# Patient Record
Sex: FEMALE | Race: WHITE | Employment: UNEMPLOYED | URBAN - METROPOLITAN AREA
[De-identification: names, ages, dates, MRNs, and addresses within clinical notes are randomized per-mention and may not be internally consistent; named-entity substitution may affect disease eponyms.]

---

## 2017-07-12 ENCOUNTER — OFFICE VISIT (OUTPATIENT)
Dept: INTERNAL MEDICINE CLINIC | Age: 42
End: 2017-07-12

## 2017-07-12 VITALS
RESPIRATION RATE: 20 BRPM | DIASTOLIC BLOOD PRESSURE: 82 MMHG | OXYGEN SATURATION: 98 % | HEIGHT: 60 IN | SYSTOLIC BLOOD PRESSURE: 120 MMHG | HEART RATE: 102 BPM | BODY MASS INDEX: 34.75 KG/M2 | TEMPERATURE: 98 F | WEIGHT: 177 LBS

## 2017-07-12 DIAGNOSIS — S33.5XXA SPRAIN, LOW BACK, INITIAL ENCOUNTER: Primary | ICD-10-CM

## 2017-07-12 DIAGNOSIS — M62.830 BACK MUSCLE SPASM: ICD-10-CM

## 2017-07-12 DIAGNOSIS — F41.8 DEPRESSION WITH ANXIETY: ICD-10-CM

## 2017-07-12 RX ORDER — PREDNISONE 20 MG/1
TABLET ORAL
Qty: 13 TAB | Refills: 0 | Status: SHIPPED | OUTPATIENT
Start: 2017-07-12 | End: 2017-12-11 | Stop reason: ALTCHOICE

## 2017-07-12 RX ORDER — BUPROPION HYDROCHLORIDE 75 MG/1
75 TABLET ORAL 2 TIMES DAILY
Qty: 60 TAB | Refills: 2 | Status: SHIPPED | OUTPATIENT
Start: 2017-07-12 | End: 2017-12-05 | Stop reason: SDUPTHER

## 2017-07-12 RX ORDER — CYCLOBENZAPRINE HCL 10 MG
10 TABLET ORAL
Qty: 10 TAB | Refills: 0 | Status: SHIPPED | OUTPATIENT
Start: 2017-07-12 | End: 2017-07-22

## 2017-07-12 RX ORDER — OMEPRAZOLE 20 MG/1
20 TABLET, DELAYED RELEASE ORAL DAILY
Qty: 30 TAB | Refills: 0 | Status: SHIPPED | OUTPATIENT
Start: 2017-07-12 | End: 2017-09-05 | Stop reason: SDUPTHER

## 2017-07-12 NOTE — PROGRESS NOTES
Written by Bianca Quezada, as dictated by Dr. Cam Alvarez MD.    Julio Zuluaga is a 39 y.o. female. HPI  The patient comes in today c/o back pain, mainly on the R side. She pulled her back while in the pool on Friday night. She has tried aspercreme and icy hot patches, which has not helped. She also c/o chest tightness. She denies coughing or wheezing. She has to sleep on her back because sleeping on her side or stomach increases the tightness. She thought it was heartburn at first but it slowly became more intense and spread out over her whole chest. She denies any more stress than usual. The tightness makes her throat feel somewhat numb. She has not been taking Wellbutrin lately but would like to restart, as she feels a difference when she stops. Patient Active Problem List   Diagnosis Code    Depression F32.9        No current outpatient prescriptions on file prior to visit. No current facility-administered medications on file prior to visit. No Known Allergies    Past Medical History:   Diagnosis Date    Anxiety     Cancer ([facility] [address] cervical    Hypertension     with pregnancy       Past Surgical History:   Procedure Laterality Date    HX GYN      ectopic pregnancy, C section    HX HEENT      tonsilectomy       Family History   Problem Relation Age of Onset    Psychiatric Disorder Mother     Cancer Paternal Grandmother     Diabetes Paternal Grandmother        Social History     Social History    Marital status: UNKNOWN     Spouse name: N/A    Number of children: N/A    Years of education: N/A     Occupational History    Not on file.      Social History Main Topics    Smoking status: Former Smoker     Quit date: 11/25/2013    Smokeless tobacco: Never Used    Alcohol use No      Comment: occ    Drug use: No    Sexual activity: Yes     Partners: Male     Other Topics Concern    Not on file     Social History Narrative         Review of Systems   Constitutional: Negative for malaise/fatigue. HENT: Negative for congestion. Respiratory: Negative for cough and shortness of breath. Cardiovascular: Negative for chest pain and palpitations. Gastrointestinal: Positive for heartburn. Negative for abdominal pain. Musculoskeletal: Positive for back pain. Negative for joint pain and myalgias. Neurological: Negative for weakness and headaches. Visit Vitals    /82    Pulse (!) 102    Temp 98 °F (36.7 °C) (Oral)    Resp 20    Ht 5' (1.524 m)    Wt 177 lb (80.3 kg)    LMP Comment: see's specilist, unknown why no perid    SpO2 98%    BMI 34.57 kg/m2       Physical Exam   Constitutional: She is oriented to person, place, and time. She appears well-developed. No distress. Obese   HENT:   Right Ear: External ear normal.   Left Ear: External ear normal.   Eyes: Conjunctivae and EOM are normal.   Neck: Normal range of motion. Neck supple. Cardiovascular: Normal rate and regular rhythm. Pulmonary/Chest: Effort normal and breath sounds normal. She has no wheezes. Abdominal: Soft. Bowel sounds are normal. There is no tenderness. Musculoskeletal:   Low back stiffness. SLRT +ve at 45 degree on right leg. Lower spine ROM limited due to pain. Neurological: She is alert and oriented to person, place, and time. Psychiatric: She has a normal mood and affect. Her behavior is normal.   Nursing note and vitals reviewed. ASSESSMENT and PLAN    ICD-10-CM ICD-9-CM    1. Sprain, low back, initial encounter S33. 9XXA 846.9 predniSONE (DELTASONE) 20 mg tablet sent to pharmacy      omeprazole (PRILOSEC OTC) 20 mg tablet sent to pharmacy   2. Back muscle spasm M62.830 724.8 predniSONE (DELTASONE) 20 mg tablet sent to pharmacy      omeprazole (PRILOSEC OTC) 20 mg tablet sent to pharmacy      cyclobenzaprine (FLEXERIL) 10 mg tablet sent to pharmacy    I want her to take prednisone.  I want the patient to take the medication po as follows: 3 pills x 2 days, 2 pills x 2 days, 1 pill x 2 days and 1/2 pill x 1 day. The patient was advised to take this medication with food. If she does not feel better by Monday, she should call us. She should also take Flexeril once a day at night. I want her to take Prilosec in the morning 30 minutes before eating. 3. Depression with anxiety F41.8 300.4 buPROPion (WELLBUTRIN) 75 mg tablet sent to pharmacy    I refilled her Wellbutrin. This plan was reviewed with the patient and patient agrees. All questions were answered. This scribe documentation was reviewed by me and accurately reflects the examination and decisions made by me. This note will not be viewable in [address]

## 2017-07-12 NOTE — MR AVS SNAPSHOT
Visit Information Date & Time Provider Department Dept. Phone Encounter #  
 7/12/2017 12:45 PM Claudine Perez MD [facility] Internal Medicine [phone] [id number] Upcoming Health Maintenance Date Due DTaP/Tdap/Td series (1 - Tdap) 11/10/1996 PAP AKA CERVICAL CYTOLOGY 2/10/2017 INFLUENZA AGE 9 TO ADULT 8/1/2017 Allergies as of 7/12/2017  Review Complete On: 7/12/2017 By: Vicky Restrepo LPN No Known Allergies Current Immunizations  Never Reviewed No immunizations on file. Not reviewed this visit You Were Diagnosed With   
  
 Codes Comments Sprain, low back, initial encounter    -  Primary ICD-10-CM: S33. 9XXA ICD-9-CM: 846.9 Back muscle spasm     ICD-10-CM: P55.032 ICD-9-CM: 724.8 Depression with anxiety     ICD-10-CM: F41.8 ICD-9-CM: 300.4 Vitals BP Pulse Temp Resp Height(growth percentile) Weight(growth percentile) 120/82 (!) 102 98 °F (36.7 °C) (Oral) 20 5' (1.524 m) 177 lb (80.3 kg) SpO2 BMI OB Status Smoking Status 98% 34.57 kg/m2 Unknown Former Smoker BMI and BSA Data Body Mass Index Body Surface Area 34.57 kg/m 2 1.84 m 2 Preferred Pharmacy Pharmacy Name Phone [id number] [place], [address] [phone] Your Updated Medication List  
  
   
This list is accurate as of: 7/12/17  1:26 PM.  Always use your most recent med list.  
  
  
  
  
 buPROPion 75 mg tablet Commonly known as:  [facility] Take 1 Tab by mouth two (2) times a day. cyclobenzaprine 10 mg tablet Commonly known as:  FLEXERIL Take 1 Tab by mouth nightly for 10 days. omeprazole 20 mg tablet Commonly known as:  PriLOSEC OTC Take 20 mg by mouth daily for 30 days. predniSONE 20 mg tablet Commonly known as:  Garrel Moron Prednisone 60 mg po x 2 days, 40 mg po x 2 days, 20 mg po x 2 days, 10 mg po x 1 day then stop. Prescriptions Sent to Pharmacy Refills  
 predniSONE (DELTASONE) 20 mg tablet 0 Sig: Prednisone 60 mg po x 2 days, 40 mg po x 2 days, 20 mg po x 2 days, 10 mg po x 1 day then stop. Class: Normal  
 Pharmacy: [id number] - [address] Ph #: [phone]  
 omeprazole (PRILOSEC OTC) 20 mg tablet 0 Sig: Take 20 mg by mouth daily for 30 days. Class: Normal  
 [facility]acy: [facility] Ph #: [phone] Route: Oral  
 cyclobenzaprine (FLEXERIL) 10 mg tablet 0 Sig: Take 1 Tab by mouth nightly for 10 days. Class: Normal  
 Pharmacy: [facility] Ph #: [phone] Route: Oral  
 buPROPion (WELLBUTRIN) 75 mg tablet 2 Sig: Take 1 Tab by mouth two (2) times a day. Class: Normal  
 Pharmacy: [facility] Ph #: [phone] Route: Oral  
  
Introducing [address]! Lakshmi Salas introduces [employer] patient portal. Now you can access parts of your medical record, email your doctor's office, and request medication refills online. 1. In your internet browser, go to [email or web address] 2. Click on the First Time User? Click Here link in the Sign In box. You will see the New Member Sign Up page. 3. Enter your [employer] Access Code exactly as it appears below. You will not need to use this code after youve completed the sign-up process. If you do not sign up before the expiration date, you must request a new code. · [employer] Access Code: [email or web address] Expires: 10/10/2017 12:58 PM 
 
4. Enter the last four digits of your Social Security Number (xxxx) and Date of Birth (mm/dd/yyyy) as indicated and click Submit.  You will be taken to the next sign-up page. 5. Create a [employer] ID. This will be your [employer] login ID and cannot be changed, so think of one that is secure and easy to remember. 6. Create a [employer] password. You can change your password at any time. 7. Enter your Password Reset Question and Answer. This can be used at a later time if you forget your password. 8. Enter your e-mail address. You will receive e-mail notification when new information is available in [address] 9. Click Sign Up. You can now view and download portions of your medical record. 10. Click the Download Summary menu link to download a portable copy of your medical information. If you have questions, please visit the Frequently Asked Questions section of the [employer] website. Remember, [employer] is NOT to be used for urgent needs. For medical emergencies, dial 911. Now available from your iPhone and Android! Please provide this summary of care documentation to your next provider. Your primary care clinician is listed as Colonel Munson. If you have any questions after today's visit, please call [phone].

## 2017-09-05 DIAGNOSIS — M62.830 BACK MUSCLE SPASM: ICD-10-CM

## 2017-09-05 DIAGNOSIS — S33.5XXA SPRAIN, LOW BACK, INITIAL ENCOUNTER: ICD-10-CM

## 2017-09-05 RX ORDER — OMEPRAZOLE 20 MG/1
CAPSULE, DELAYED RELEASE ORAL
Qty: 30 CAP | Refills: 0 | Status: SHIPPED | OUTPATIENT
Start: 2017-09-05 | End: 2017-10-27 | Stop reason: SDUPTHER

## 2017-10-27 DIAGNOSIS — M62.830 BACK MUSCLE SPASM: ICD-10-CM

## 2017-10-27 DIAGNOSIS — S33.5XXA SPRAIN, LOW BACK, INITIAL ENCOUNTER: ICD-10-CM

## 2017-10-27 RX ORDER — OMEPRAZOLE 20 MG/1
CAPSULE, DELAYED RELEASE ORAL
Qty: 30 CAP | Refills: 0 | Status: SHIPPED | OUTPATIENT
Start: 2017-10-27 | End: 2017-12-05 | Stop reason: SDUPTHER

## 2017-12-05 DIAGNOSIS — S33.5XXA SPRAIN, LOW BACK, INITIAL ENCOUNTER: ICD-10-CM

## 2017-12-05 DIAGNOSIS — F41.8 DEPRESSION WITH ANXIETY: ICD-10-CM

## 2017-12-05 DIAGNOSIS — M62.830 BACK MUSCLE SPASM: ICD-10-CM

## 2017-12-05 RX ORDER — BUPROPION HYDROCHLORIDE 75 MG/1
TABLET ORAL
Qty: 60 TAB | Refills: 2 | Status: SHIPPED | OUTPATIENT
Start: 2017-12-05 | End: 2020-03-06 | Stop reason: SDUPTHER

## 2017-12-05 RX ORDER — OMEPRAZOLE 20 MG/1
CAPSULE, DELAYED RELEASE ORAL
Qty: 30 CAP | Refills: 0 | Status: SHIPPED | OUTPATIENT
Start: 2017-12-05 | End: 2020-03-06 | Stop reason: ALTCHOICE

## 2017-12-11 ENCOUNTER — HOSPITAL ENCOUNTER (OUTPATIENT)
Dept: GENERAL RADIOLOGY | Age: 42
Discharge: HOME | End: 2017-12-11
Payer: MEDICAID

## 2017-12-11 ENCOUNTER — OFFICE VISIT (OUTPATIENT)
Dept: INTERNAL MEDICINE CLINIC | Age: 42
End: 2017-12-11

## 2017-12-11 VITALS
BODY MASS INDEX: 32.59 KG/M2 | HEART RATE: 103 BPM | TEMPERATURE: 98.5 F | WEIGHT: 166 LBS | DIASTOLIC BLOOD PRESSURE: 84 MMHG | OXYGEN SATURATION: 98 % | HEIGHT: 60 IN | RESPIRATION RATE: 16 BRPM | SYSTOLIC BLOOD PRESSURE: 138 MMHG

## 2017-12-11 DIAGNOSIS — R07.81 RIB PAIN ON LEFT SIDE: ICD-10-CM

## 2017-12-11 DIAGNOSIS — R03.0 ELEVATED BLOOD PRESSURE READING: ICD-10-CM

## 2017-12-11 DIAGNOSIS — J40 BRONCHITIS: ICD-10-CM

## 2017-12-11 DIAGNOSIS — R07.81 RIB PAIN ON LEFT SIDE: Primary | ICD-10-CM

## 2017-12-11 DIAGNOSIS — R00.0 TACHYCARDIA: ICD-10-CM

## 2017-12-11 DIAGNOSIS — E66.9 CLASS 1 OBESITY WITH BODY MASS INDEX (BMI) OF 32.0 TO 32.9 IN ADULT, UNSPECIFIED OBESITY TYPE, UNSPECIFIED WHETHER SERIOUS COMORBIDITY PRESENT: ICD-10-CM

## 2017-12-11 PROCEDURE — 71020 XR CHEST PA LAT: CPT

## 2017-12-11 RX ORDER — KETOROLAC TROMETHAMINE 10 MG/1
10 TABLET, FILM COATED ORAL
Qty: 10 TAB | Refills: 0 | Status: SHIPPED | OUTPATIENT
Start: 2017-12-11 | End: 2019-04-05 | Stop reason: ALTCHOICE

## 2017-12-11 NOTE — PROGRESS NOTES
Spoke with patient by phone to inform negative chest x-ray results. Has a f/u appointment with Dr. Disha Tee in 3 days 12/14/17. Advised to go to ED if symptoms worsen.

## 2017-12-11 NOTE — PROGRESS NOTES
Chief Complaint   Patient presents with    Follow-up     on blood pressure and insomnia.  states she just got over bronchitis and she has pain under the left breast and thinks it was from her coughing. she had er visit for the blood pressure. [facility].

## 2017-12-11 NOTE — PROGRESS NOTES
This note will not be viewable in [address] Rom Anderson is a  43 y.o. female presents for visit. F/U Bronchitis and elevated BP    Chief Complaint   Patient presents with    Follow-up     on blood pressure and insomnia.  states she just got over bronchitis and she has pain under the left breast and thinks it was from her coughing. she had er visit for the blood pressure. [place] emergency. HPI  Patient presents for f/u Bronchitis. Was seen in [facility] ED on 11/24/17 and dx with Bronchitis. Patient brought diagnostic testing for review. Work up included normal chest X-ray, EKG sinus tachycardia, CBC, abnormal BMP Na 132 L and Cl 96 L, UA negative for Leuk and positive for Nitrates. Was treated with 2 L IV fluids,  Z-pack and vicodin which she did not like how it made her feel. Was given rx for naproxen but did not use it. Took OTC robitussin DM which was effective for cough. Currently reports occasional cough with light yellow phlegm and constant pain in left anterior ribs that started 1 week ago. Increased stabbing pain when coughing or lying on left side or stomach. Rates pain as 8/10. Denies SOB or wheezing, nasal congestion. States she is concerned about elevated blood pressure and tachycardia which has been intermittent over past 3 years. VS reviewed in [facility]. States she does not drink much caffeine and does not smoke. Review of Systems   Constitutional: Negative for chills and fever. HENT: Negative for congestion and nosebleeds. Respiratory: Positive for sputum production (yellow). Negative for cough and shortness of breath. Cardiovascular: Negative for chest pain and palpitations. Gastrointestinal: Negative for abdominal pain, constipation, diarrhea, heartburn, nausea and vomiting. Genitourinary: Negative for dysuria, flank pain, hematuria and urgency. Musculoskeletal: Positive for back pain.         Left anterior rib pain below breast.    Neurological: Negative for dizziness and headaches. Psychiatric/Behavioral: The patient has insomnia (on-going.). Visit Vitals    /84 (BP 1 Location: Left arm, BP Patient Position: Sitting)    Pulse (!) 103    Temp 98.5 °F (36.9 °C) (Oral)    Resp 16    Ht 5' (1.524 m)    Wt 166 lb (75.3 kg)    SpO2 98%    BMI 32.42 kg/m2     Physical Exam   Constitutional: She is oriented to person, place, and time. She appears distressed (in pain). obese   HENT:   Head: Normocephalic and atraumatic. Eyes: Conjunctivae are normal.   Pulmonary/Chest: Breath sounds normal. No accessory muscle usage. No tachypnea. No respiratory distress. Abdominal: Soft. Normal appearance and bowel sounds are normal. There is no tenderness. Musculoskeletal: She exhibits tenderness. Left anterior ribs below breast   Neurological: She is alert and oriented to person, place, and time. Skin: Skin is warm and dry. Psychiatric: She has a normal mood and affect. Her behavior is normal.   Nursing note and vitals reviewed. EKG: Sinus tachycardia and left atrial enlargement. No results found for this or any previous visit (from the past 24 hour(s)). Patient Active Problem List    Diagnosis Date Noted    Depression 01/21/2015         ASSESSMENT AND PLAN:      ICD-10-CM ICD-9-CM   1. Rib pain on left side R07.81 786.50   2. Tachycardia R00.0 785.0   3. Elevated blood pressure reading R03.0 796.2   4. Bronchitis J40 490   5. Class 1 obesity with body mass index (BMI) of 32.0 to 32.9 in adult, unspecified obesity type, unspecified whether serious comorbidity present E66.9 278.00    Z68.32 V85.32     Orders Placed This Encounter    XR CHEST PA LAT     Standing Status:   Future     Number of Occurrences:   1     Standing Expiration Date:   1/10/2019     Order Specific Question:   Reason for Exam     Answer:   rib pain     Order Specific Question:   Is Patient Allergic to Contrast Dye?      Answer:   Unknown     Order Specific Question:   Is Patient Pregnant? Answer:   No    REFERRAL TO CARDIOLOGY     Referral Priority:   Routine     Referral Type:   Consultation     Referral Reason:   Specialty Services Required     Referred to Provider:   Patric Baumgarten, MD     Requested Specialty:   Cardiology    AMB POC EKG ROUTINE W/ 12 LEADS, INTER & REP     Order Specific Question:   Reason for Exam:     Answer:   tachycardia    ketorolac (TORADOL) 10 mg tablet     Sig: Take 1 Tab by mouth every six (6) hours as needed for Pain. Indications: Severe Pain     Dispense:  10 Tab     Refill:  0     Diagnoses and all orders for this visit:    1. Rib pain on left side  -     XR CHEST PA LAT; Future  -     REFERRAL TO CARDIOLOGY-Will call patient with X-ray results this afternoon when back. -     Will start Naproxen that was prescribed in ED. Declined Ultram.  2. Tachycardia  -     AMB POC EKG ROUTINE W/ 12 LEADS, INTER & REP  -     REFERRAL TO CARDIOLOGY    3. Elevated blood pressure reading  -     AMB POC EKG ROUTINE W/ 12 LEADS, INTER & REP  -     REFERRAL TO CARDIOLOGY    4. Bronchitis-? Pneumonia-Will call patient this afternoon with results. -     XR CHEST PA LAT; Future    5. Class 1 obesity with body mass index (BMI) of 32.0 to 32.9 in adult, unspecified obesity type, unspecified whether serious comorbidity present    11 pound weight loss since 07/2017, Continue to eat healthy and LAURA diet and continue to work on weight loss. radiology results and schedule of future radiology studies reviewed with patient    [facility] work up 11/24/17 scanned under media tab. Follow-up Disposition:  Return in about 5 days (around 12/16/2017) for FULL Phyisal Exam and Insomnia with Dr. Alice Goff. Spoke with patient this afternoon. Negative Chest X-ray. Stated Naproxen has not improved pain. Sent in a rx for toradol and dc naproxen.     Disclaimer:  Advised her to call back or return to office if symptoms worsen/change/persist.  Discussed expected course/resolution/complications of diagnosis in detail with patient. Medication risks/benefits/alternatives discussed with patient. She was given an after visit summary which includes diagnoses, current medications, & vitals. Discussed patient instructions and advised to read to all patient instructions regarding care. She expressed understanding with the diagnosis and plan.

## 2018-01-12 ENCOUNTER — TELEPHONE (OUTPATIENT)
Dept: CARDIOLOGY CLINIC | Age: 43
End: 2018-01-12

## 2018-01-12 NOTE — TELEPHONE ENCOUNTER
Referred by Dr Ana Venegas to Dr Annel Prater for tachy and HTN. Need new patient appointment. Will have PSR contact and schedule.

## 2018-03-28 ENCOUNTER — TELEPHONE (OUTPATIENT)
Dept: CARDIOLOGY CLINIC | Age: 43
End: 2018-03-28

## 2018-09-10 ENCOUNTER — OFFICE VISIT (OUTPATIENT)
Dept: INTERNAL MEDICINE CLINIC | Age: 43
End: 2018-09-10

## 2018-09-10 VITALS
SYSTOLIC BLOOD PRESSURE: 126 MMHG | DIASTOLIC BLOOD PRESSURE: 82 MMHG | HEART RATE: 84 BPM | BODY MASS INDEX: 23.92 KG/M2 | TEMPERATURE: 98.6 F | WEIGHT: 130 LBS | OXYGEN SATURATION: 98 % | RESPIRATION RATE: 16 BRPM | HEIGHT: 62 IN

## 2018-09-10 DIAGNOSIS — R06.2 WHEEZING: ICD-10-CM

## 2018-09-10 DIAGNOSIS — H66.003 ACUTE SUPPURATIVE OTITIS MEDIA OF BOTH EARS WITHOUT SPONTANEOUS RUPTURE OF TYMPANIC MEMBRANES, RECURRENCE NOT SPECIFIED: Primary | ICD-10-CM

## 2018-09-10 DIAGNOSIS — R05.8 COUGH PRODUCTIVE OF PURULENT SPUTUM: ICD-10-CM

## 2018-09-10 RX ORDER — AMOXICILLIN 875 MG/1
875 TABLET, FILM COATED ORAL 2 TIMES DAILY
Qty: 20 TAB | Refills: 0 | Status: SHIPPED | OUTPATIENT
Start: 2018-09-10 | End: 2019-04-05 | Stop reason: ALTCHOICE

## 2018-09-10 RX ORDER — ALBUTEROL SULFATE 90 UG/1
2 AEROSOL, METERED RESPIRATORY (INHALATION)
Qty: 1 INHALER | Refills: 0 | Status: SHIPPED | OUTPATIENT
Start: 2018-09-10 | End: 2019-04-05 | Stop reason: ALTCHOICE

## 2018-09-10 RX ORDER — GUAIFENESIN 600 MG/1
600 TABLET, EXTENDED RELEASE ORAL
Qty: 20 TAB | Refills: 0 | Status: SHIPPED | OUTPATIENT
Start: 2018-09-10 | End: 2019-04-05 | Stop reason: ALTCHOICE

## 2018-09-10 NOTE — PROGRESS NOTES
Chief Complaint Patient presents with  Sore Throat  
  2 days  Nasal Congestion  Chest Congestion  Hoarse Visit Vitals  /82 (BP 1 Location: Left arm, BP Patient Position: Sitting)  Pulse 84  Temp 98.6 °F (37 °C) (Oral)  Resp 16  
 Ht 5' 1.52\" (1.563 m)  Wt 130 lb (59 kg)  SpO2 98%  BMI 24.15 kg/m2 1. Have you been to the ER, urgent care clinic since your last visit? Hospitalized since your last visit?no 2. Have you seen or consulted any other health care providers outside of the [address] [place] [address] since your last visit? Include any pap smears or colon screening.  no

## 2018-09-10 NOTE — PROGRESS NOTES
This note will not be viewable in [address] Marycarmen Samayoa is a  43 y.o. female presents for visit. Upper respiratory infection Chief Complaint Patient presents with  Sore Throat  
  2 days  Nasal Congestion  Chest Congestion  Hoarse HPI Patient presents for evaluation of URI symptoms that started about 2 days ago. She reports productive cough with yellow sputum, chest tightness, nasal congestion and her ears itch. Has been taking Mucinex and drinking water with no improvement in symptoms. Patient denies history of seasonal allergies. Reports her son started  last week. Unknown sick contacts. Review of Systems Constitutional: Negative for chills and fever. HENT: Positive for congestion, ear pain (itching) and sore throat. Negative for sinus pain. Respiratory: Positive for cough and sputum production (yellow). Cardiovascular: Negative for chest pain and palpitations. Visit Vitals  /82 (BP 1 Location: Left arm, BP Patient Position: Sitting)  Pulse 84  Temp 98.6 °F (37 °C) (Oral)  Resp 16  
 Ht 5' 1.52\" (1.563 m)  Wt 130 lb (59 kg)  SpO2 98%  BMI 24.15 kg/m2 Physical Exam  
Constitutional: She appears ill. HENT:  
Right Ear: Tympanic membrane is injected. Tympanic membrane is not erythematous. No middle ear effusion. Left Ear: Tympanic membrane is injected. Tympanic membrane is not erythematous. No middle ear effusion. Nose: Mucosal edema and rhinorrhea present. Right sinus exhibits no maxillary sinus tenderness and no frontal sinus tenderness. Left sinus exhibits no maxillary sinus tenderness and no frontal sinus tenderness. Mouth/Throat: Uvula is midline and mucous membranes are normal. No oropharyngeal exudate or posterior oropharyngeal erythema. Eyes: EOM are normal. Right conjunctiva is injected (minimal).  Left eye conjunctiva injected: minimal.  
 Cardiovascular: Regular rhythm and normal heart sounds. No murmur heard. Pulmonary/Chest: Effort normal. She has wheezes. Lymphadenopathy:  
  She has cervical adenopathy. Nursing note and vitals reviewed. Patient Active Problem List  
 Diagnosis Date Noted  Depression 01/21/2015 ASSESSMENT AND PLAN: 
 
  ICD-10-CM ICD-9-CM 1. Acute suppurative otitis media of both ears without spontaneous rupture of tympanic membranes, recurrence not specified H66.003 382.00  
2. Wheezing R06.2 786.07  
3. Cough productive of purulent sputum R05 786.2 Orders Placed This Encounter  amoxicillin (AMOXIL) 875 mg tablet Sig: Take 1 Tab by mouth two (2) times a day. Indications: acute bacterial otitis media Dispense:  20 Tab Refill:  0  
 albuterol (PROVENTIL HFA, VENTOLIN HFA, PROAIR HFA) 90 mcg/actuation inhaler Sig: Take 2 Puffs by inhalation every four (4) hours as needed for Wheezing. Dispense:  1 Inhaler Refill:  0  
 guaiFENesin ER (MUCINEX) 600 mg ER tablet Sig: Take 1 Tab by mouth two (2) times daily as needed for Congestion. Indications: COLD SYMPTOMS, Cough Dispense:  20 Tab Refill:  0 Diagnoses and all orders for this visit: 
 
1. Acute suppurative otitis media of both ears without spontaneous rupture of tympanic membranes, recurrence not specified 
-     amoxicillin (AMOXIL) 875 mg tablet; Take 1 Tab by mouth two (2) times a day. Indications: acute bacterial otitis media 2. Wheezing 
-     albuterol (PROVENTIL HFA, VENTOLIN HFA, PROAIR HFA) 90 mcg/actuation inhaler; Take 2 Puffs by inhalation every four (4) hours as needed for Wheezing. 3. Cough productive of purulent sputum 
-     guaiFENesin ER (MUCINEX) 600 mg ER tablet; Take 1 Tab by mouth two (2) times daily as needed for Congestion. Indications: COLD SYMPTOMS, Cough Counseled patient cough is most likely viral and treatment is symptomatic. Antibiotics are not effective with viral illnesses Follow-up Disposition: 
Return if symptoms worsen or fail to improve. Disclaimer: 
Advised her to call back or return to office if symptoms worsen/change/persist. 
Discussed expected course/resolution/complications of diagnosis in detail with patient. Medication risks/benefits/alternatives discussed with patient. She was given an after visit summary which includes diagnoses, current medications, & vitals. Discussed patient instructions and advised to read to all patient instructions regarding care. She expressed understanding with the diagnosis and plan.

## 2019-04-05 ENCOUNTER — HOSPITAL ENCOUNTER (EMERGENCY)
Age: 44
Discharge: HOME | End: 2019-04-05
Attending: EMERGENCY MEDICINE | Admitting: EMERGENCY MEDICINE
Payer: COMMERCIAL

## 2019-04-05 ENCOUNTER — APPOINTMENT (OUTPATIENT)
Dept: CT IMAGING | Age: 44
End: 2019-04-05
Attending: EMERGENCY MEDICINE
Payer: COMMERCIAL

## 2019-04-05 ENCOUNTER — OFFICE VISIT (OUTPATIENT)
Dept: PRIMARY CARE CLINIC | Age: 44
End: 2019-04-05

## 2019-04-05 VITALS
SYSTOLIC BLOOD PRESSURE: 129 MMHG | DIASTOLIC BLOOD PRESSURE: 87 MMHG | WEIGHT: 117.73 LBS | OXYGEN SATURATION: 93 % | BODY MASS INDEX: 21.88 KG/M2 | RESPIRATION RATE: 16 BRPM | HEART RATE: 89 BPM | TEMPERATURE: 97.9 F

## 2019-04-05 VITALS
HEART RATE: 103 BPM | OXYGEN SATURATION: 100 % | RESPIRATION RATE: 16 BRPM | SYSTOLIC BLOOD PRESSURE: 141 MMHG | TEMPERATURE: 98.5 F | WEIGHT: 117.6 LBS | HEIGHT: 62 IN | BODY MASS INDEX: 21.64 KG/M2 | DIASTOLIC BLOOD PRESSURE: 98 MMHG

## 2019-04-05 DIAGNOSIS — K35.890 OTHER ACUTE APPENDICITIS: ICD-10-CM

## 2019-04-05 DIAGNOSIS — R11.0 NAUSEA: Primary | ICD-10-CM

## 2019-04-05 DIAGNOSIS — R10.10 PAIN OF UPPER ABDOMEN: ICD-10-CM

## 2019-04-05 DIAGNOSIS — R10.31 RIGHT LOWER QUADRANT ABDOMINAL PAIN: ICD-10-CM

## 2019-04-05 DIAGNOSIS — R10.31 ABDOMINAL PAIN, RIGHT LOWER QUADRANT: Primary | ICD-10-CM

## 2019-04-05 DIAGNOSIS — N91.2 AMENORRHEA: ICD-10-CM

## 2019-04-05 LAB
ALBUMIN SERPL-MCNC: 3.4 G/DL (ref 3.5–5)
ALBUMIN/GLOB SERPL: 0.7 {RATIO} (ref 1.1–2.2)
ALP SERPL-CCNC: 275 U/L (ref 45–117)
ALT SERPL-CCNC: 49 U/L (ref 12–78)
ANION GAP SERPL CALC-SCNC: 11 MMOL/L (ref 5–15)
APPEARANCE UR: ABNORMAL
AST SERPL-CCNC: 143 U/L (ref 15–37)
BACTERIA URNS QL MICRO: ABNORMAL /HPF
BASOPHILS # BLD: 0 K/UL (ref 0–0.1)
BASOPHILS NFR BLD: 1 % (ref 0–1)
BILIRUB SERPL-MCNC: 0.3 MG/DL (ref 0.2–1)
BILIRUB UR QL STRIP: NORMAL
BILIRUB UR QL: NEGATIVE
BUN SERPL-MCNC: 7 MG/DL (ref 6–20)
BUN/CREAT SERPL: 9 (ref 12–20)
CALCIUM SERPL-MCNC: 10 MG/DL (ref 8.5–10.1)
CHLORIDE SERPL-SCNC: 94 MMOL/L (ref 97–108)
CO2 SERPL-SCNC: 27 MMOL/L (ref 21–32)
COLOR UR: ABNORMAL
COMMENT, HOLDF: NORMAL
CREAT SERPL-MCNC: 0.8 MG/DL (ref 0.55–1.02)
DIFFERENTIAL METHOD BLD: ABNORMAL
EOSINOPHIL # BLD: 0 K/UL (ref 0–0.4)
EOSINOPHIL NFR BLD: 1 % (ref 0–7)
EPITH CASTS URNS QL MICRO: ABNORMAL /LPF
ERYTHROCYTE [DISTWIDTH] IN BLOOD BY AUTOMATED COUNT: 12.9 % (ref 11.5–14.5)
GLOBULIN SER CALC-MCNC: 5 G/DL (ref 2–4)
GLUCOSE SERPL-MCNC: 96 MG/DL (ref 65–100)
GLUCOSE UR STRIP.AUTO-MCNC: NEGATIVE MG/DL
GLUCOSE UR-MCNC: NEGATIVE MG/DL
GRAN CASTS URNS QL MICRO: ABNORMAL /LPF
HCG URINE, QL. (POC): NEGATIVE
HCT VFR BLD AUTO: 45.5 % (ref 35–47)
HGB BLD-MCNC: 15.5 G/DL (ref 11.5–16)
HGB UR QL STRIP: ABNORMAL
HYALINE CASTS URNS QL MICRO: ABNORMAL /LPF (ref 0–5)
IMM GRANULOCYTES # BLD AUTO: 0 K/UL
IMM GRANULOCYTES NFR BLD AUTO: 1 %
KETONES P FAST UR STRIP-MCNC: NORMAL MG/DL
KETONES UR QL STRIP.AUTO: ABNORMAL MG/DL
LEUKOCYTE ESTERASE UR QL STRIP.AUTO: NEGATIVE
LIPASE SERPL-CCNC: 138 U/L (ref 73–393)
LYMPHOCYTES # BLD: 1.3 K/UL (ref 0.8–3.5)
LYMPHOCYTES NFR BLD: 27 % (ref 12–49)
MCH RBC QN AUTO: 32.4 PG (ref 26–34)
MCHC RBC AUTO-ENTMCNC: 34.1 G/DL (ref 30–36.5)
MCV RBC AUTO: 95.2 FL (ref 80–99)
MONOCYTES # BLD: 0.9 K/UL (ref 0–1)
MONOCYTES NFR BLD: 18 % (ref 5–13)
NEUTS SEG # BLD: 2.6 K/UL (ref 1.8–8)
NEUTS SEG NFR BLD: 52 % (ref 32–75)
NITRITE UR QL STRIP.AUTO: NEGATIVE
NRBC # BLD: 0 K/UL (ref 0–0.01)
NRBC BLD-RTO: 0 PER 100 WBC
PH UR STRIP: 6.5 [PH] (ref 5–8)
PH UR STRIP: 7 [PH] (ref 4.6–8)
PLATELET # BLD AUTO: 141 K/UL (ref 150–400)
PMV BLD AUTO: 9.7 FL (ref 8.9–12.9)
POTASSIUM SERPL-SCNC: 4.2 MMOL/L (ref 3.5–5.1)
PROT SERPL-MCNC: 8.4 G/DL (ref 6.4–8.2)
PROT UR QL STRIP: NEGATIVE
PROT UR STRIP-MCNC: NEGATIVE MG/DL
RBC # BLD AUTO: 4.78 M/UL (ref 3.8–5.2)
RBC #/AREA URNS HPF: ABNORMAL /HPF (ref 0–5)
RBC MORPH BLD: ABNORMAL
SAMPLES BEING HELD,HOLD: NORMAL
SODIUM SERPL-SCNC: 132 MMOL/L (ref 136–145)
SP GR UR REFRACTOMETRY: 1.01 (ref 1–1.03)
SP GR UR STRIP: 1.02 (ref 1–1.03)
UA UROBILINOGEN AMB POC: NORMAL (ref 0.2–1)
UR CULT HOLD, URHOLD: NORMAL
URINALYSIS CLARITY POC: NORMAL
URINALYSIS COLOR POC: NORMAL
URINE BLOOD POC: NORMAL
URINE LEUKOCYTES POC: NEGATIVE
URINE NITRITES POC: NEGATIVE
UROBILINOGEN UR QL STRIP.AUTO: 0.2 EU/DL (ref 0.2–1)
VALID INTERNAL CONTROL?: YES
WBC # BLD AUTO: 4.8 K/UL (ref 3.6–11)
WBC MORPH BLD: ABNORMAL
WBC URNS QL MICRO: ABNORMAL /HPF (ref 0–4)

## 2019-04-05 PROCEDURE — 80053 COMPREHEN METABOLIC PANEL: CPT

## 2019-04-05 PROCEDURE — 74011636320 HC RX REV CODE- 636/320: Performed by: EMERGENCY MEDICINE

## 2019-04-05 PROCEDURE — 96375 TX/PRO/DX INJ NEW DRUG ADDON: CPT

## 2019-04-05 PROCEDURE — 96361 HYDRATE IV INFUSION ADD-ON: CPT

## 2019-04-05 PROCEDURE — 74177 CT ABD & PELVIS W/CONTRAST: CPT

## 2019-04-05 PROCEDURE — 96374 THER/PROPH/DIAG INJ IV PUSH: CPT

## 2019-04-05 PROCEDURE — 85025 COMPLETE CBC W/AUTO DIFF WBC: CPT

## 2019-04-05 PROCEDURE — 36415 COLL VENOUS BLD VENIPUNCTURE: CPT

## 2019-04-05 PROCEDURE — 81001 URINALYSIS AUTO W/SCOPE: CPT

## 2019-04-05 PROCEDURE — 83690 ASSAY OF LIPASE: CPT

## 2019-04-05 PROCEDURE — 74011000258 HC RX REV CODE- 258: Performed by: EMERGENCY MEDICINE

## 2019-04-05 PROCEDURE — 74011250636 HC RX REV CODE- 250/636: Performed by: EMERGENCY MEDICINE

## 2019-04-05 PROCEDURE — 99284 EMERGENCY DEPT VISIT MOD MDM: CPT

## 2019-04-05 RX ORDER — KETOROLAC TROMETHAMINE 30 MG/ML
15 INJECTION, SOLUTION INTRAMUSCULAR; INTRAVENOUS
Status: COMPLETED | OUTPATIENT
Start: 2019-04-05 | End: 2019-04-05

## 2019-04-05 RX ORDER — ONDANSETRON 4 MG/1
4 TABLET, ORALLY DISINTEGRATING ORAL
Qty: 5 TAB | Refills: 1 | Status: SHIPPED | OUTPATIENT
Start: 2019-04-05 | End: 2019-04-06

## 2019-04-05 RX ORDER — ONDANSETRON 2 MG/ML
4 INJECTION INTRAMUSCULAR; INTRAVENOUS
Status: COMPLETED | OUTPATIENT
Start: 2019-04-05 | End: 2019-04-05

## 2019-04-05 RX ORDER — SODIUM CHLORIDE 0.9 % (FLUSH) 0.9 %
10 SYRINGE (ML) INJECTION ONCE
Status: COMPLETED | OUTPATIENT
Start: 2019-04-05 | End: 2019-04-05

## 2019-04-05 RX ADMIN — Medication 10 ML: at 15:20

## 2019-04-05 RX ADMIN — SODIUM CHLORIDE 1000 ML: 900 INJECTION, SOLUTION INTRAVENOUS at 14:01

## 2019-04-05 RX ADMIN — ONDANSETRON 4 MG: 2 INJECTION INTRAMUSCULAR; INTRAVENOUS at 14:01

## 2019-04-05 RX ADMIN — SODIUM CHLORIDE 100 ML: 900 INJECTION, SOLUTION INTRAVENOUS at 15:21

## 2019-04-05 RX ADMIN — KETOROLAC TROMETHAMINE 15 MG: 30 INJECTION, SOLUTION INTRAMUSCULAR; INTRAVENOUS at 14:02

## 2019-04-05 RX ADMIN — IOPAMIDOL 100 ML: 755 INJECTION, SOLUTION INTRAVENOUS at 15:21

## 2019-04-05 NOTE — PROGRESS NOTES
Chief Complaint   Patient presents with    Abdominal Pain     pain in lower right side abdomen, comes and goes, constantly tender x 3days    Nausea     mostly nauseous in morning and night time    Medication Evaluation     would like to talk about being put back on wellbutrin    Anxiety       1. Have you been to the ER, urgent care clinic since your last visit? Hospitalized since your last visit? No    2. Have you seen or consulted any other health care providers outside of the [address] [place] [address] since your last visit? Include any pap smears or colon screening.  No

## 2019-04-05 NOTE — ED NOTES
Patient unable to void at this time. IV fluids infusing. Call bell within reach. Will continue to monitor.

## 2019-04-05 NOTE — PROGRESS NOTES
Gutierrez Chisholm is a 37 y.o.  female and presents with     Chief Complaint   Patient presents with    Abdominal Pain     pain in lower right side abdomen, comes and goes, constantly tender x 3days    Nausea     mostly nauseous in morning and night time    Medication Evaluation     would like to talk about being put back on wellbutrin    Anxiety     Pt has been having nausea, abdominal pain , occasional vomiting , chills for  Past  2-3 weeks. Pt also did pregnancy test times 3 at home and it was neg. Pt had her last menses in 13 years. Menses stopped in late 25s. She has seen Obgyn in the past and everything was fine/  She denies headaches, blurry vision, breast discharge  Pt denies headaches  Pt denies urinary symptoms        Past Medical History:   Diagnosis Date    Anxiety     Cancer ([facility] [address] cervical    Hypertension     with pregnancy     Past Surgical History:   Procedure Laterality Date    HX GYN      ectopic pregnancy, C section    HX HEENT      tonsilectomy     Current Outpatient Medications   Medication Sig    omeprazole (PRILOSEC) 20 mg capsule TAKE ONE CAPSULE BY MOUTH EVERY DAY    buPROPion (WELLBUTRIN) 75 mg tablet TAKE 1 TAB BY MOUTH TWO (2) TIMES A DAY. No current facility-administered medications for this visit. Health Maintenance   Topic Date Due    DTaP/Tdap/Td series (1 - Tdap) 11/10/1996    PAP AKA CERVICAL CYTOLOGY  02/10/2017    Influenza Age 5 to Adult  08/01/2019    Pneumococcal 0-64 years  Aged Out       There is no immunization history on file for this patient. No LMP recorded. Allergies and Intolerances:   No Known Allergies    Family History:   Family History   Problem Relation Age of Onset    Psychiatric Disorder Mother     Cancer Paternal Grandmother     Diabetes Paternal Grandmother        Social History:   She  reports that she has been smoking. She has been smoking about 0.50 packs per day.  She has never used smokeless tobacco.  She  reports that she does not drink alcohol. Review of Systems: pos for nausea, vomiting,abdominal pain. General: negative for - chills, fatigue, fever, weight change  Psych: negative for - anxiety, depression, irritability or mood swings  ENT: negative for - headaches, hearing change, nasal congestion, oral lesions, sneezing or sore throat  Heme/ Lymph: negative for - bleeding problems, bruising, pallor or swollen lymph nodes  Endo: negative for - hot flashes, polydipsia/polyuria or temperature intolerance  Resp: negative for - cough, shortness of breath or wheezing  CV: negative for - chest pain, edema or palpitations  GI: negative for - abdominal pain, change in bowel habits, constipation, diarrhea or nausea/vomiting  : negative for - dysuria, hematuria, incontinence, pelvic pain or vulvar/vaginal symptoms  MSK: negative for - joint pain, joint swelling or muscle pain  Neuro: negative for - confusion, headaches, seizures or weakness  Derm: negative for - dry skin, hair changes, rash or skin lesion changes          Physical:   Vitals:   Vitals:    04/05/19 1302   BP: (!) 141/98   Pulse: (!) 103   Resp: 16   Temp: 98.5 °F (36.9 °C)   TempSrc: Oral   SpO2: 100%   Weight: 117 lb 9.6 oz (53.3 kg)   Height: 5' 1.5\" (1.562 m)           Exam:   HEENT- atraumatic,normocephalic, awake, oriented, well nourished  Neck - supple,no enlarged lymph nodes, no JVD, no thyromegaly  Chest- CTA, no rhonchi, no crackles  Heart- rrr, no murmurs / gallop/rub, tachycardic  Abdomen- soft,BS+,NT, no hepatosplenomegaly,RLQ tenderness  Ext - no c/c/edema   Neuro- no focal deficits. Power 5/5 all extremities  Skin - warm,dry, no obvious rashes.           Review of Data:   LABS:   Lab Results   Component Value Date/Time    WBC 8.9 08/11/2014 01:50 PM    HGB 13.0 08/11/2014 01:50 PM    HCT 38.5 08/11/2014 01:50 PM    PLATELET 545 58/70/3297 01:50 PM     Lab Results   Component Value Date/Time    Sodium 135 (L) 08/11/2014 01:50 PM    Potassium 4.5 08/11/2014 01:50 PM    Chloride 101 08/11/2014 01:50 PM    CO2 24 08/11/2014 01:50 PM    Glucose 81 08/11/2014 01:50 PM    BUN 7 08/11/2014 01:50 PM    Creatinine 0.66 08/11/2014 01:50 PM     No results found for: CHOL, CHOLX, CHLST, CHOLV, HDL, LDL, LDLC, DLDLP, TGLX, TRIGL, TRIGP  No results found for: GPT        Impression / Plan:        ICD-10-CM ICD-9-CM    1. Nausea R11.0 787.02 AMB POC URINE PREGNANCY TEST, VISUAL COLOR COMPARISON   2. Amenorrhea N91.2 626.0    3. Pain of upper abdomen R10.10 789.09    4. Other acute appendicitis K35.890 540.9          RLQ pain, tachycardia, nausea, vomiting - r/o appendicitis - Refer to ER. Microscopic hematuria - may need to r/o kidney stones. Explained to patient risk benefits of the medications. Advised patient to stop meds if having any side effects. Pt verbalized understanding of the instructions. I have discussed the diagnosis with the patient and the intended plan as seen in the above orders. The patient has received an after-visit summary and questions were answered concerning future plans. I have discussed medication side effects and warnings with the patient as well. I have reviewed the plan of care with the patient, accepted their input and they are in agreement with the treatment goals. Reviewed plan of care. Patient has provided input and agrees with goals.         Emma Downs MD

## 2019-04-05 NOTE — ED PROVIDER NOTES
HPI  
 
  36y F here with RLQ pain. Started with nausea for about the past 2 weeks which has been worse in the past 3 days to the point she doesn't want to eat. Also now with RLQ pain for the past couple of days. Pain doesn't radiate. She noticed blood in her urine today but no dysuria. Pain worse when laying flat. No fever. No vomiting. No diarrhea. No hx of similar pain. Went to her PMD today who sent her here for further evaluation. No chest pain. No trouble breathing. No rash. Past Medical History:  
Diagnosis Date  Anxiety  Cancer ([facility] [address] cervical  
 Hypertension   
 with pregnancy Past Surgical History:  
Procedure Laterality Date  HX GYN    
 ectopic pregnancy, C section  HX HEENT    
 tonsilectomy Family History:  
Problem Relation Age of Onset  Psychiatric Disorder Mother  Cancer Paternal Grandmother  Diabetes Paternal Grandmother Social History Socioeconomic History  Marital status: UNKNOWN Spouse name: Not on file  Number of children: Not on file  Years of education: Not on file  Highest education level: Not on file Occupational History  Not on file Social Needs  Financial resource strain: Not on file  Food insecurity:  
  Worry: Not on file Inability: Not on file  Transportation needs:  
  Medical: Not on file Non-medical: Not on file Tobacco Use  Smoking status: Current Every Day Smoker Packs/day: 0.50  Smokeless tobacco: Never Used Substance and Sexual Activity  Alcohol use: No  
  Alcohol/week: 1.0 - 1.5 oz Types: 2 - 3 Standard drinks or equivalent per week Comment: socially  Drug use: No  
 Sexual activity: Yes  
  Partners: Male Lifestyle  Physical activity:  
  Days per week: Not on file Minutes per session: Not on file  Stress: Not on file Relationships  Social connections:  
  Talks on phone: Not on file Gets together: Not on file Attends [employer] service: Not on file Active member of club or organization: Not on file Attends meetings of clubs or organizations: Not on file Relationship status: Not on file  Intimate partner violence:  
  Fear of current or ex partner: Not on file Emotionally abused: Not on file Physically abused: Not on file Forced sexual activity: Not on file Other Topics Concern  Not on file Social History Narrative  Not on file ALLERGIES: Patient has no known allergies. Review of Systems Review of Systems Constitutional: (-) weight loss. HEENT: (-) stiff neck Eyes: (-) discharge. Respiratory: (-) cough. Cardiovascular: (-) syncope. Gastrointestinal: (-) blood in stool. Genitourinary: (+) hematuria. Musculoskeletal: (-) myalgias. Neurological: (-) seizure. Skin: (-) petechiae Lymph/Immunologic: (-) enlarged lymph nodes All other systems reviewed and are negative. Vitals:  
 04/05/19 1350 BP: 154/89 Pulse: (!) 102 Resp: 16 Temp: 98.4 °F (36.9 °C) SpO2: 100% Weight: 53.4 kg (117 lb 11.6 oz) Physical Exam Nursing note and vitals reviewed. Constitutional: oriented to person, place, and time. appears well-developed and well-nourished. No distress. Head: Normocephalic and atraumatic. Sclera anicteric Nose: No rhinorrhea Mouth/Throat: Oropharynx is clear and moist. Pharynx normal 
Eyes: Conjunctivae are normal. Pupils are equal, round, and reactive to light. Right eye exhibits no discharge. Left eye exhibits no discharge. Neck: Painless normal range of motion. Neck supple. No LAD. Cardiovascular: Normal rate, regular rhythm, normal heart sounds and intact distal pulses. Exam reveals no gallop and no friction rub. No murmur heard. Pulmonary/Chest:  No respiratory distress. No wheezes. No rales. No rhonchi. No increased work of breathing. No accessory muscle use. Good air exchange throughout. Abdominal: soft, tender in the RLQ with guarding, no rebound. No hepatosplenomegaly. Normal bowel sounds throughout. Back: no tenderness to palpation, no deformities, no CVA tenderness Extremities/Musculoskeletal: Normal range of motion. no tenderness. No edema. Distal extremities are neurovasc intact. Lymphadenopathy:   No adenopathy. Neurological:  Alert and oriented to person, place, and time. Coordination normal. CN 2-12 intact. Motor and sensory function intact. Skin: Skin is warm and dry. No rash noted. No pallor. MDM 36y F here with nausea and RLQ pain. Needs labs and imaging. Procedures 3:49 PM 
Pt feeling better. Labs and imaging unremarkable. Will have her follow-up with GI. Return precautions discussed.

## 2019-04-05 NOTE — DISCHARGE INSTRUCTIONS

## 2019-04-06 RX ORDER — ONDANSETRON 4 MG/1
4 TABLET, ORALLY DISINTEGRATING ORAL
Qty: 15 TAB | Refills: 1 | Status: SHIPPED | OUTPATIENT
Start: 2019-04-06 | End: 2020-03-06 | Stop reason: ALTCHOICE

## 2019-11-29 ENCOUNTER — OFFICE VISIT (OUTPATIENT)
Dept: PRIMARY CARE CLINIC | Age: 44
End: 2019-11-29

## 2019-11-29 VITALS
OXYGEN SATURATION: 100 % | BODY MASS INDEX: 21.42 KG/M2 | WEIGHT: 116.4 LBS | TEMPERATURE: 98.2 F | HEIGHT: 62 IN | SYSTOLIC BLOOD PRESSURE: 125 MMHG | DIASTOLIC BLOOD PRESSURE: 76 MMHG | HEART RATE: 98 BPM | RESPIRATION RATE: 16 BRPM

## 2019-11-29 DIAGNOSIS — M25.562 ACUTE PAIN OF BOTH KNEES: Primary | ICD-10-CM

## 2019-11-29 DIAGNOSIS — M25.561 ACUTE PAIN OF BOTH KNEES: Primary | ICD-10-CM
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
## 2019-11-29 DIAGNOSIS — M54.9 ACUTE LEFT-SIDED BACK PAIN, UNSPECIFIED BACK LOCATION: ICD-10-CM

## 2019-11-29 RX ORDER — PREDNISONE 20 MG/1
40 TABLET ORAL DAILY
Qty: 10 TAB | Refills: 0 | Status: SHIPPED | OUTPATIENT
Start: 2019-11-29 | End: 2020-03-06 | Stop reason: ALTCHOICE
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
## 2020-01-02 ENCOUNTER — HOSPITAL ENCOUNTER (OUTPATIENT)
Dept: MRI IMAGING | Age: 45
Discharge: HOME | End: 2020-01-02
Attending: FAMILY MEDICINE

## 2020-01-02 DIAGNOSIS — S63.601A SPRAIN OF RIGHT THUMB: ICD-10-CM

## 2020-01-16 ENCOUNTER — HOSPITAL ENCOUNTER (OUTPATIENT)
Dept: MRI IMAGING | Age: 45
Discharge: HOME | End: 2020-01-16
Attending: FAMILY MEDICINE
Payer: COMMERCIAL

## 2020-01-16 DIAGNOSIS — S63.601A SPRAIN OF RIGHT THUMB: ICD-10-CM

## 2020-01-16 PROCEDURE — 73218 MRI UPPER EXTREMITY W/O DYE: CPT

## 2020-03-06 ENCOUNTER — OFFICE VISIT (OUTPATIENT)
Dept: PRIMARY CARE CLINIC | Age: 45
End: 2020-03-06

## 2020-03-06 VITALS
SYSTOLIC BLOOD PRESSURE: 167 MMHG | HEART RATE: 111 BPM | HEIGHT: 61 IN | OXYGEN SATURATION: 97 % | WEIGHT: 121 LBS | BODY MASS INDEX: 22.84 KG/M2 | TEMPERATURE: 99.2 F | RESPIRATION RATE: 20 BRPM | DIASTOLIC BLOOD PRESSURE: 95 MMHG

## 2020-03-06 DIAGNOSIS — F41.9 ANXIETY: Primary | ICD-10-CM

## 2020-03-06 DIAGNOSIS — F41.8 DEPRESSION WITH ANXIETY: ICD-10-CM

## 2020-03-06 RX ORDER — BUPROPION HYDROCHLORIDE 75 MG/1
75 TABLET ORAL 2 TIMES DAILY
Qty: 60 TAB | Refills: 5 | Status: SHIPPED | OUTPATIENT
Start: 2020-03-06 | End: 2021-01-11 | Stop reason: ALTCHOICE

## 2020-03-06 NOTE — LETTER
NOTIFICATION RETURN TO WORK / SCHOOL 
 
3/6/2020 4:29 PM 
 
Ms. Marielle Acevedo [address] [facility],[address] [address] To Whom It May Concern: 
 
Marielle Acevedo is currently under the care of Natalya Stover. She will return to work/school on: 3/9/2020 If there are questions or concerns please have the patient contact our office.  
 
 
 
Sincerely, 
 
 
Narda Mueller MD

## 2020-03-06 NOTE — PROGRESS NOTES
Chief Complaint   Patient presents with    Medication Evaluation     questions regarding wellbutrin    Medication Refill       1. Have you been to the ER, urgent care clinic since your last visit? Hospitalized since your last visit? No    2. Have you seen or consulted any other health care providers outside of the [address] [place] [address] since your last visit? Include any pap smears or colon screening.  No
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
## 2020-11-03 ENCOUNTER — VIRTUAL VISIT (OUTPATIENT)
Dept: PRIMARY CARE CLINIC | Age: 45
End: 2020-11-03
Payer: COMMERCIAL

## 2020-11-03 DIAGNOSIS — J01.01 ACUTE RECURRENT MAXILLARY SINUSITIS: Primary | ICD-10-CM

## 2020-11-03 DIAGNOSIS — Z91.09 ENVIRONMENTAL ALLERGIES: ICD-10-CM

## 2020-11-03 PROCEDURE — 99213 OFFICE O/P EST LOW 20 MIN: CPT | Performed by: INTERNAL MEDICINE

## 2020-11-03 RX ORDER — AMOXICILLIN AND CLAVULANATE POTASSIUM 875; 125 MG/1; MG/1
1 TABLET, FILM COATED ORAL EVERY 12 HOURS
Qty: 20 TAB | Refills: 0 | Status: SHIPPED | OUTPATIENT
Start: 2020-11-03 | End: 2020-11-13

## 2020-11-03 NOTE — PROGRESS NOTES
Written by Sharon Frost, as dictated by Dr. Jose Juan Staley MD.    Ananda Lennon is a 40 y.o. female. HPI  I was in the office while conducting this encounter. Consent:  She and/or her healthcare decision maker is aware that this patient-initiated Telehealth encounter is a billable service, with coverage as determined by her insurance carrier. She is aware that she may receive a bill and has provided verbal consent to proceed: Yes    This virtual visit was conducted via [employer] me. Pursuant to the emergency declaration under the [facility], [facility] waiver authority and the [employer] Act, this Virtual  Visit was conducted to reduce the patient's risk of exposure to COVID-19 and provide continuity of care for an established patient. Services were provided through a video synchronous discussion virtually to substitute for in-person clinic visit. Due to this being a TeleHealth evaluation, many elements of the physical examination are unable to be assessed. Due to technical difficulties, this virtual visit was done via audio only. Pt presents virtually today to discuss sinus pain and congestion x2 weeks. She has been taking DayQuil with no relief and has constant mucus coming out of her nose which is green and yellow. This congestion will not go away even though she is using OTC antihistamines for allergies. She has not been using nasal spray because it makes her very uncomfortable. Patient Active Problem List   Diagnosis Code    Depression F32.9        Current Outpatient Medications on File Prior to Visit   Medication Sig Dispense Refill    buPROPion (WELLBUTRIN) 75 mg tablet Take 1 Tab by mouth two (2) times a day. 60 Tab 5     No current facility-administered medications on file prior to visit.         No Known Allergies    Past Medical History:   Diagnosis Date    Anxiety     Cancer ([facility] [address] cervical    Hypertension     with pregnancy       Past Surgical History:   Procedure Laterality Date    HX GYN      ectopic pregnancy, C section    HX HEENT      tonsilectomy       Family History   Problem Relation Age of Onset    Psychiatric Disorder Mother     Cancer Paternal Grandmother     Diabetes Paternal Grandmother        Social History     Socioeconomic History    Marital status: SINGLE     Spouse name: Not on file    Number of children: Not on file    Years of education: Not on file    Highest education level: Not on file   Occupational History    Not on file   Social Needs    Financial resource strain: Not on file    Food insecurity     Worry: Not on file     Inability: Not on file    Transportation needs     Medical: Not on file     Non-medical: Not on file   Tobacco Use    Smoking status: Current Every Day Smoker     Packs/day: 0.50    Smokeless tobacco: Never Used   Substance and Sexual Activity    Alcohol use: Yes     Alcohol/week: 1.7 - 2.5 standard drinks     Types: 2 - 3 Standard drinks or equivalent per week     Comment: socially    Drug use: Never    Sexual activity: Yes     Partners: Male   Lifestyle    Physical activity     Days per week: Not on file     Minutes per session: Not on file    Stress: Not on file   Relationships    Social connections     Talks on phone: Not on file     Gets together: Not on file     Attends Amish service: Not on file     Active member of club or organization: Not on file     Attends meetings of clubs or organizations: Not on file     Relationship status: Not on file    Intimate partner violence     Fear of current or ex partner: Not on file     Emotionally abused: Not on file     Physically abused: Not on file     Forced sexual activity: Not on file   Other Topics Concern    Not on file   Social History Narrative    Not on file       No visits with results within 3 Month(s) from this visit.    Latest known visit with results is:   Admission on 04/05/2019, Discharged on 04/05/2019   Component Date Value Ref Range Status    SAMPLES BEING HELD 04/05/2019 1red, 1blu   Final    COMMENT 04/05/2019 Add-on orders for these samples will be processed based on acceptable specimen integrity and analyte stability, which may vary by analyte. Final    WBC 04/05/2019 4.8  3.6 - 11.0 K/uL Final    RBC 04/05/2019 4.78  3.80 - 5.20 M/uL Final    HGB 04/05/2019 15.5  11.5 - 16.0 g/dL Final    HCT 04/05/2019 45.5  35.0 - 47.0 % Final    MCV 04/05/2019 95.2  80.0 - 99.0 FL Final    MCH 04/05/2019 32.4  26.0 - 34.0 PG Final    MCHC 04/05/2019 34.1  30.0 - 36.5 g/dL Final    RDW 04/05/2019 12.9  11.5 - 14.5 % Final    PLATELET 08/08/6268 010* 150 - 400 K/uL Final    MPV 04/05/2019 9.7  8.9 - 12.9 FL Final    NRBC 04/05/2019 0.0  0  WBC Final    ABSOLUTE NRBC 04/05/2019 0.00  0.00 - 0.01 K/uL Final    NEUTROPHILS 04/05/2019 52  32 - 75 % Final    LYMPHOCYTES 04/05/2019 27  12 - 49 % Final    MONOCYTES 04/05/2019 18* 5 - 13 % Final    EOSINOPHILS 04/05/2019 1  0 - 7 % Final    BASOPHILS 04/05/2019 1  0 - 1 % Final    IMMATURE GRANULOCYTES 04/05/2019 1  % Final    ABS. NEUTROPHILS 04/05/2019 2.6  1.8 - 8.0 K/UL Final    ABS. LYMPHOCYTES 04/05/2019 1.3  0.8 - 3.5 K/UL Final    ABS. MONOCYTES 04/05/2019 0.9  0.0 - 1.0 K/UL Final    ABS. EOSINOPHILS 04/05/2019 0.0  0.0 - 0.4 K/UL Final    ABS. BASOPHILS 04/05/2019 0.0  0.0 - 0.1 K/UL Final    ABS. IMM.  GRANS. 04/05/2019 0.0  K/UL Final    DF 04/05/2019 SMEAR SCANNED    Final    RBC COMMENTS 04/05/2019 NORMOCYTIC, NORMOCHROMIC    Final    WBC COMMENTS 04/05/2019 REACTIVE LYMPHS    Final    PRESENT    Lipase 04/05/2019 138  73 - 393 U/L Final    Color 04/05/2019 YELLOW/STRAW    Final    Color Reference Range: Straw, Yellow or Dark Yellow    Appearance 04/05/2019 HAZY* CLEAR   Final    Specific gravity 04/05/2019 1.010  1.003 - 1.030   Final    pH (UA) 04/05/2019 6.5  5.0 - 8.0   Final    Protein 04/05/2019 NEGATIVE   NEG mg/dL Final    Glucose 04/05/2019 NEGATIVE   NEG mg/dL Final    Ketone 04/05/2019 TRACE* NEG mg/dL Final    Bilirubin 04/05/2019 NEGATIVE   NEG   Final    Blood 04/05/2019 MODERATE* NEG   Final    Urobilinogen 04/05/2019 0.2  0.2 - 1.0 EU/dL Final    Nitrites 04/05/2019 NEGATIVE   NEG   Final    Leukocyte Esterase 04/05/2019 NEGATIVE   NEG   Final    WBC 04/05/2019 0-4  0 - 4 /hpf Final    RBC 04/05/2019 0-5  0 - 5 /hpf Final    Epithelial cells 04/05/2019 MANY* FEW /lpf Final    Epithelial cell category consists of squamous cells and /or transitional urothelial cells. Renal tubular cells, if present, are separately identified as such.  Bacteria 04/05/2019 1+* NEG /hpf Final    Hyaline cast 04/05/2019 0-2  0 - 5 /lpf Final    Granular cast 04/05/2019 0-2* NEG /lpf Final    Sodium 04/05/2019 132* 136 - 145 mmol/L Final    Potassium 04/05/2019 4.2  3.5 - 5.1 mmol/L Final    Chloride 04/05/2019 94* 97 - 108 mmol/L Final    CO2 04/05/2019 27  21 - 32 mmol/L Final    Anion gap 04/05/2019 11  5 - 15 mmol/L Final    Glucose 04/05/2019 96  65 - 100 mg/dL Final    BUN 04/05/2019 7  6 - 20 MG/DL Final    Creatinine 04/05/2019 0.80  0.55 - 1.02 MG/DL Final    BUN/Creatinine ratio 04/05/2019 9* 12 - 20   Final    GFR est AA 04/05/2019 >60  >60 ml/min/1.73m2 Final    GFR est non-AA 04/05/2019 >60  >60 ml/min/1.73m2 Final    Comment: Estimated GFR is calculated using the IDMS-traceable Modification of Diet in Renal Disease (MDRD) Study equation, reported for both  Americans (GFRAA) and non- Americans (GFRNA), and normalized to 1.73m2 body surface area. The physician must decide which value applies to the patient. The MDRD study equation should only be used in individuals age 25 or older.  It has not been validated for the following: pregnant women, patients with serious comorbid conditions, or on certain medications, or persons with extremes of body size, muscle mass, or nutritional status.  Calcium 04/05/2019 10.0  8.5 - 10.1 MG/DL Final    Bilirubin, total 04/05/2019 0.3  0.2 - 1.0 MG/DL Final    ALT (SGPT) 04/05/2019 49  12 - 78 U/L Final    AST (SGOT) 04/05/2019 143* 15 - 37 U/L Final    Alk. phosphatase 04/05/2019 275* 45 - 117 U/L Final    Protein, total 04/05/2019 8.4* 6.4 - 8.2 g/dL Final    Albumin 04/05/2019 3.4* 3.5 - 5.0 g/dL Final    Globulin 04/05/2019 5.0* 2.0 - 4.0 g/dL Final    A-G Ratio 04/05/2019 0.7* 1.1 - 2.2   Final    Urine culture hold 04/05/2019 URINE ON HOLD IN MICROBIOLOGY DEPT FOR 3 DAYS. IF UNPRESERVED URINE IS SUBMITTED, IT CANNOT BE USED FOR ADDITIONAL TESTING AFTER 24 HRS, RECOLLECTION WILL BE REQUIRED. Final     Review of Systems   Constitutional: Negative for malaise/fatigue and weight loss. HENT: Positive for congestion and sinus pain. Negative for sore throat. Eyes: Negative for blurred vision. Respiratory: Negative for cough and shortness of breath. Cardiovascular: Negative for chest pain and leg swelling. Gastrointestinal: Negative for constipation and heartburn. Genitourinary: Negative for frequency and urgency. Musculoskeletal: Negative for back pain, joint pain and myalgias. Neurological: Negative for dizziness and headaches. Psychiatric/Behavioral: Negative for depression. The patient is not nervous/anxious and does not have insomnia. There were no vitals taken for this visit. Physical Exam  Nursing note reviewed. Constitutional:       Appearance: Normal appearance. She is well-developed and well-groomed. HENT:      Head: Normocephalic and atraumatic. Nose: No congestion. Eyes:      General:         Right eye: No discharge. Left eye: No discharge. Pulmonary:      Effort: Pulmonary effort is normal.      Breath sounds: No wheezing. Neurological:      Mental Status: She is alert and oriented to person, place, and time.    Psychiatric:         Mood and Affect: Mood normal.         Behavior: Behavior normal.       ASSESSMENT and PLAN    ICD-10-CM ICD-9-CM    1. Acute recurrent maxillary sinusitis  J01.01 461.0 amoxicillin-clavulanate (AUGMENTIN) 875-125 mg per tablet sent to pharmacy. Potential side effects were discussed. I prescribed Augmentin for her sinusitis. 2. Environmental allergies  Z91.09 V15.09 Pt continues on OTC antihistamines. This plan was reviewed with the patient and patient agrees. All questions were answered. This scribe documentation was reviewed by me and accurately reflects the examination and decisions made by me.

## 2021-01-11 ENCOUNTER — OFFICE VISIT (OUTPATIENT)
Dept: PRIMARY CARE CLINIC | Age: 46
End: 2021-01-11
Payer: COMMERCIAL

## 2021-01-11 VITALS
OXYGEN SATURATION: 97 % | BODY MASS INDEX: 20.09 KG/M2 | SYSTOLIC BLOOD PRESSURE: 127 MMHG | WEIGHT: 106.4 LBS | HEART RATE: 103 BPM | RESPIRATION RATE: 16 BRPM | HEIGHT: 61 IN | DIASTOLIC BLOOD PRESSURE: 87 MMHG | TEMPERATURE: 97.4 F

## 2021-01-11 DIAGNOSIS — F10.90 ALCOHOL DRINKING PROBLEM: ICD-10-CM

## 2021-01-11 DIAGNOSIS — R00.0 TACHYCARDIA: ICD-10-CM

## 2021-01-11 DIAGNOSIS — F41.9 ANXIETY: ICD-10-CM

## 2021-01-11 DIAGNOSIS — R63.4 EXCESSIVE WEIGHT LOSS: ICD-10-CM

## 2021-01-11 DIAGNOSIS — K70.9 ALCOHOLIC LIVER DISEASE (HCC): Primary | ICD-10-CM

## 2021-01-11 PROCEDURE — 99214 OFFICE O/P EST MOD 30 MIN: CPT | Performed by: INTERNAL MEDICINE

## 2021-01-11 RX ORDER — PAROXETINE 10 MG/1
10 TABLET, FILM COATED ORAL DAILY
Qty: 30 TAB | Refills: 0 | Status: SHIPPED | OUTPATIENT
Start: 2021-01-11 | End: 2021-02-06

## 2021-01-11 RX ORDER — NALTREXONE HYDROCHLORIDE 50 MG/1
50 TABLET, FILM COATED ORAL DAILY
Qty: 30 TAB | Refills: 0 | Status: SHIPPED | OUTPATIENT
Start: 2021-01-11 | End: 2021-02-10
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
## 2021-01-11 NOTE — PROGRESS NOTES
Chief Complaint   Patient presents with    Abdominal [address] Follow Up     went to freestanding er in [place] as the paingot so bad after making the appointment. pain starred about 2 weeks ago sharp pain in the middle of the stomach.  went to the ER and states that it is her gall badder and liver.

## 2021-02-06 DIAGNOSIS — F41.9 ANXIETY: ICD-10-CM

## 2021-02-06 RX ORDER — PAROXETINE 10 MG/1
TABLET, FILM COATED ORAL
Qty: 30 TAB | Refills: 0 | Status: SHIPPED | OUTPATIENT
Start: 2021-02-06 | End: 2021-04-05 | Stop reason: SDUPTHER

## 2021-02-25 ENCOUNTER — OFFICE VISIT (OUTPATIENT)
Dept: HEMATOLOGY | Age: 46
End: 2021-02-25
Payer: COMMERCIAL

## 2021-02-25 VITALS
SYSTOLIC BLOOD PRESSURE: 133 MMHG | TEMPERATURE: 97.5 F | WEIGHT: 103 LBS | RESPIRATION RATE: 18 BRPM | OXYGEN SATURATION: 98 % | HEIGHT: 61 IN | HEART RATE: 110 BPM | DIASTOLIC BLOOD PRESSURE: 91 MMHG | BODY MASS INDEX: 19.45 KG/M2

## 2021-02-25 DIAGNOSIS — K70.9 ALCOHOLIC LIVER DISEASE (HCC): Primary | ICD-10-CM

## 2021-02-25 PROBLEM — D69.6 THROMBOCYTOPENIA (HCC): Status: ACTIVE | Noted: 2021-02-25

## 2021-02-25 PROBLEM — E87.1 HYPONATREMIA: Status: ACTIVE | Noted: 2021-02-25

## 2021-02-25 PROBLEM — R74.8 ELEVATED LIVER ENZYMES: Status: ACTIVE | Noted: 2021-02-25

## 2021-02-25 LAB
ALBUMIN SERPL-MCNC: 3.6 G/DL (ref 3.5–5)
ALBUMIN/GLOB SERPL: 0.8 {RATIO} (ref 1.1–2.2)
ALP SERPL-CCNC: 226 U/L (ref 45–117)
ALT SERPL-CCNC: 59 U/L (ref 12–78)
ANION GAP SERPL CALC-SCNC: 10 MMOL/L (ref 5–15)
AST SERPL-CCNC: 191 U/L (ref 15–37)
BASOPHILS # BLD: 0.1 K/UL (ref 0–0.1)
BASOPHILS NFR BLD: 1 % (ref 0–1)
BILIRUB DIRECT SERPL-MCNC: 0.2 MG/DL (ref 0–0.2)
BILIRUB SERPL-MCNC: 0.4 MG/DL (ref 0.2–1)
BUN SERPL-MCNC: 2 MG/DL (ref 6–20)
BUN/CREAT SERPL: 4 (ref 12–20)
CALCIUM SERPL-MCNC: 9.4 MG/DL (ref 8.5–10.1)
CHLORIDE SERPL-SCNC: 103 MMOL/L (ref 97–108)
CO2 SERPL-SCNC: 24 MMOL/L (ref 21–32)
CREAT SERPL-MCNC: 0.49 MG/DL (ref 0.55–1.02)
DIFFERENTIAL METHOD BLD: ABNORMAL
EOSINOPHIL # BLD: 0.1 K/UL (ref 0–0.4)
EOSINOPHIL NFR BLD: 1 % (ref 0–7)
ERYTHROCYTE [DISTWIDTH] IN BLOOD BY AUTOMATED COUNT: 12.9 % (ref 11.5–14.5)
FERRITIN SERPL-MCNC: 1808 NG/ML (ref 26–388)
GLOBULIN SER CALC-MCNC: 4.4 G/DL (ref 2–4)
GLUCOSE SERPL-MCNC: 81 MG/DL (ref 65–100)
HBV SURFACE AB SER QL: NONREACTIVE
HBV SURFACE AB SER-ACNC: <3.1 MIU/ML
HBV SURFACE AG SER QL: <0.1 INDEX
HBV SURFACE AG SER QL: NEGATIVE
HCT VFR BLD AUTO: 39.5 % (ref 35–47)
HGB BLD-MCNC: 13.4 G/DL (ref 11.5–16)
IMM GRANULOCYTES # BLD AUTO: 0 K/UL (ref 0–0.04)
IMM GRANULOCYTES NFR BLD AUTO: 1 % (ref 0–0.5)
INR PPP: 1 (ref 0.9–1.1)
IRON SATN MFR SERPL: 93 % (ref 20–50)
IRON SERPL-MCNC: 210 UG/DL (ref 35–150)
LYMPHOCYTES # BLD: 1.7 K/UL (ref 0.8–3.5)
LYMPHOCYTES NFR BLD: 40 % (ref 12–49)
MCH RBC QN AUTO: 32.8 PG (ref 26–34)
MCHC RBC AUTO-ENTMCNC: 33.9 G/DL (ref 30–36.5)
MCV RBC AUTO: 96.8 FL (ref 80–99)
MONOCYTES # BLD: 0.4 K/UL (ref 0–1)
MONOCYTES NFR BLD: 10 % (ref 5–13)
NEUTS SEG # BLD: 2 K/UL (ref 1.8–8)
NEUTS SEG NFR BLD: 47 % (ref 32–75)
NRBC # BLD: 0 K/UL (ref 0–0.01)
NRBC BLD-RTO: 0 PER 100 WBC
PLATELET # BLD AUTO: 199 K/UL (ref 150–400)
PMV BLD AUTO: 9.7 FL (ref 8.9–12.9)
POTASSIUM SERPL-SCNC: 4 MMOL/L (ref 3.5–5.1)
PROT SERPL-MCNC: 8 G/DL (ref 6.4–8.2)
PROTHROMBIN TIME: 10.8 SEC (ref 9–11.1)
RBC # BLD AUTO: 4.08 M/UL (ref 3.8–5.2)
SODIUM SERPL-SCNC: 137 MMOL/L (ref 136–145)
TIBC SERPL-MCNC: 225 UG/DL (ref 250–450)
WBC # BLD AUTO: 4.2 K/UL (ref 3.6–11)

## 2021-02-25 PROCEDURE — 99204 OFFICE O/P NEW MOD 45 MIN: CPT | Performed by: INTERNAL MEDICINE

## 2021-02-25 RX ORDER — ONDANSETRON 8 MG/1
TABLET, ORALLY DISINTEGRATING ORAL
COMMUNITY
Start: 2021-01-06

## 2021-02-25 NOTE — PROGRESS NOTES
Identified pt with two pt identifiers(name and ). Reviewed record in preparation for visit and have obtained necessary documentation. Chief Complaint   Patient presents with    New Patient      Vitals:    21 0833   BP: (!) 133/91   Pulse: (!) 110   Resp: 18   Temp: 97.5 °F (36.4 °C)   TempSrc: Temporal   SpO2: 98%   Weight: 103 lb (46.7 kg)   Height: 5' 1\" (1.549 m)   PainSc:   0 - No pain       Health Maintenance Review: Patient reminded of \"due or due soon\" health maintenance. I have asked the patient to contact his/her primary care provider (PCP) for follow-up on his/her health maintenance. Coordination of Care Questionnaire:  :   1) Have you been to an emergency room, urgent care, or hospitalized since your last visit? If yes, where when, and reason for visit? yes     [facility]; Abdominal pain     2. Have seen or consulted any other health care provider since your last visit? If yes, where when, and reason for visit? NO      Patient is accompanied by boyfriend I have received verbal consent from Randee Jalloh to discuss any/all medical information while they are present in the room.

## 2021-02-25 NOTE — Clinical Note
3/2/2021 
 
Patient: Jacque Jenkins  
YOB: 1975  
Date of Visit: 2/25/2021  
 
Sera Hill MD 
[address] [address] [address] 
Via In Basket 
 
Dear Sera Hill MD, 
 
 
Thank you for referring Ms. Jacque Jenkins to [facility] for evaluation. My notes for this consultation are attached. 
 
If you have questions, please do not hesitate to call me. I look forward to following your patient along with you. 
 
 
Sincerely, 
 
Mike Velez MD

## 2021-02-25 NOTE — PROGRESS NOTES
[facility] [address]      Piotr Monson MD, Blu Noel, Caitlin Luther MD, MPH      Georges Barrera, PA-FERNANDO Baker, [facility]     Priscila Novoa, [facility]   [place] Bourne, [facility]    Jessica Peng, [facility]       [facility] [address]    at [address]    [address] [facility], [address] [place] [address].    [phone]    FAX: [address] [place] [address] Avenue    [address], [address]    [phone]    FAX: [phone]       Patient Care Team:  Nicolasa Kumar MD as PCP - General (Internal Medicine)  Nicolasa Kumar MD as PCP - [facility] Provider      Problem List  Date Reviewed: 2/25/2021          Codes Class Noted    Elevated liver enzymes ICD-10-CM: R74.8  ICD-9-CM: 790.5  2/25/2021        Thrombocytopenia ([address] ICD-10-CM: D69.6  ICD-9-CM: 287.5  2/25/2021        Hyponatremia ICD-10-CM: E87.1  ICD-9-CM: 276.1  2/25/2021        Anxiety ICD-10-CM: F41.9  ICD-9-CM: 300.00  1/21/2015              The clinicians listed above have asked me to see Nelson Mcmanus in consultation regarding elevated liver enzymes and its management. All medical records sent by the referring physicians were reviewed including imaging studies     The patient is a 39 y.o. female who was found to have elevated  liver transaminases and alkaline phosphatase in 4/2019. The patient consumes 15 alcoholic drinks per day  She says she is now trying to cut down on her alcohol consumption. Serologic evaluation for markers of chronic liver disease has either not been performed or the results are not available. CT scan of the liver was performed in 4/2019. The results of the imaging demonstrated a normal appearing liver. She developed abdominal pain 1/2021.   She had lAbs and imaging but these are not available to me at this time. An assessment of liver fibrosis with biopsy or elastography has not been performed. The patient has the following symptoms which are thought to be due to the liver disease:  fatigue, diffuse abdominal pain, nausea, diarrhea,   She has lost 79 pounds in 2020 without dieting. The patient is not currently experiencing the following symptoms of liver disease:  yellowing of the eyes or skin, problems concentrating, swelling of the abdomen, swelling of the lower extremities, hematemesis, hematochezia. The patient has Moderate limitations in functional activities which can be attributed to the liver disease. ASSESSMENT AND PLAN:  Elevated liver enzymes  Persistent elevation in liver transaminases and alkaline phosphatase     Have performed laboratory testing to monitor liver function and degree of liver injury. This included BMP, hepatic panel, CBC with platelet count, INR. AST is elevated. ALT is normal.  ALP is elevated. Liver function is normal.  The platelet count is   normal.      Based upon laboratory studies and imaging  the patient may have significant liver injury. Serologic testing for causes of chronic liver disease was ordered. Results was positive for AMA, an elevation in Ferritin and FE saturation    The most likely causes for the liver chemistry abnormalities were discussed with the patient and include   immune liver disorders, alcohol    The need to perform an assessment of liver fibrosis was discussed with the patient. The Fibroscan can assess liver fibrosis and determine if a patient has advanced fibrosis or cirrhosis without the need for liver biopsy. This will be performed at the next office visit. If the Fibroscan suggests advanced fibrosis then a liver biopsy should be considered. The Fibroscan can be repeated annually or as often as clinically indicated to assess for fibrosis progression and/or regression.     The need to perform a liver biopsy to help determine the cause and severity of the liver test abnormalities was discussed. The risks of performing the liver biopsy including pain, puncture of the lung, gallbladder, intestine or kidney and bleeding were discussed. Will defer liver biopsy for now. Primary Biliary Cholangitis  The diagnosis is based upon serology, and an elevation in ALP. An assessment of liver fibrosis will need to be performed. I would advocate for liver biopsy since the liver enzyme abnormalities could be due to ETOH and not PBC    AST is elevated. ALT is normal.  ALP is elevated. Liver function is normal.  The platelet count is   normal.      Elevation in Ferritin  There is an elevation in ferritin with Elevated iron saturation. It is unlikely that the patient has hemochromatosis or is a carrier for an HFE gene. Will order HFE genetic testing. Suspect the elevation in ferritin is secondary to alcohol use and will come down to normal with abstinence. Weight loss  Weight loss of 79 pounds during the past year is almost certainly due to ETOH abuse and lack of calorie consumption. She may have already dev,eoped cirrhosis from ETOH which can also lead to weight loss. Abdominal CT scan in 4/2019 does not show any abdominal process that could account for this. Will repeat an abdominal CT scan. Malnutrition/muscle wasting  The patient has severe protein-calorie malnutrition with severe muscle wasting of the upper extremities, lower extremities, facial muscles  Malnutrition and muscle wasting is due to Alcohol abuse and poor caloric intake,    She may also have cirrhosis and poor caloric utilization,     The patient needs large amounts of calories as carbs and as much protein as tolerated without developed HE to increase muscle mass.     Alcohol liver disease  The diagnosis is based upon a history of consuming alcohol in excess, pattern of AST>ALT, an elevation in ferritin with normal FE saturation,     The histologic severity has not been defined. Given that she has serologic markers and liver enzymes consistent for PBC we will need to know if she has PBC, ETOH or PBC and ETOH. Only a liver biopsy will be able to sort this out at this time. The patient is consuming 15 alcoholic beverages daily. Discussed the need to wean off and stop using alcohol within the next 2 weeks. If the patient cannot stop consuming alcohol then there is an alcohol abuse disorder and the patient should consider entering alcohol counseling and/or attend AA. The patient has an alcohol abuse disorder and it was suggested that they enter alcohol counseling and/or attend AA. Screening for Hepatocellular Carcinoma  HCC screening is not necessary if the patient has no evidence of cirrhosis. Treatment of other medical problems in patients with chronic liver disease  There are no contraindications for the patient to take most medications that are necessary for treatment of other medical issues. The patient consumes alcohol on a daily basis or has recently stopped consuming alcohol. Regular alcohol use increases the risk of toxicity from acetaminophen. This analgesic should be avoided until the patient has been abstinent from alcohol for 6 months. Vaccinations   Vaccination for viral hepatitis A and B is recommended since the patient has no serologic evidence of previous exposure or vaccination with immunity. Routine vaccinations against other bacterial and viral agents can be performed as indicated. Annual flu vaccination should be administered if indicated. ALLERGIES  No Known Allergies    MEDICATIONS  Current Outpatient Medications   Medication Sig    ondansetron (ZOFRAN ODT) 8 mg disintegrating tablet TAKE 1 TABLET BY MOUTH 3 TIMES A DAY AS NEEDED FOR NAUSEA    PARoxetine (PAXIL) 10 mg tablet TAKE 1 TABLET BY MOUTH EVERY DAY     No current facility-administered medications for this visit. SYSTEM REVIEW NOT RELATED TO LIVER DISEASE OR REVIEWED ABOVE:  Constitution systems: Negative for fever, chills, weight gain, weight loss. Eyes: Negative for visual changes. ENT: Negative for sore throat, painful swallowing. Respiratory: Negative for cough, hemoptysis, SOB. Cardiology: Negative for chest pain, palpitations. GI:  Negative for constipation or diarrhea. : Negative for urinary frequency, dysuria, hematuria, nocturia. Skin: Negative for rash. Hematology: Negative for easy bruising, blood clots. Musculo-skelatal: Negative for back pain, muscle pain, weakness. Neurologic: Negative for headaches, dizziness, vertigo, memory problems not related to HE. Psychology: Negative for anxiety, depression. FAMILY HISTORY:  The father Has/had the following following chronic disease(s): Prostate cancer  The mother Has/had the following chronic disease(s): None. There is no family history of liver disease. SOCIAL HISTORY:  The patient is . The patient has 1 child. The patient currently smokes 2 pack of tobacco daily. The patient consumes 15 alcoholic beverages per day   She is now reducing alcohol content. The patient has not worked since 2019. PHYSICAL EXAMINATION:  Visit Vitals  BP (!) 133/91 (BP 1 Location: Right upper arm, BP Patient Position: Sitting, BP Cuff Size: Adult)   Pulse (!) 110   Temp 97.5 °F (36.4 °C) (Temporal)   Resp 18   Ht 5' 1\" (1.549 m)   Wt 103 lb (46.7 kg)   SpO2 98%   BMI 19.46 kg/m²     General: No acute distress. Eyes: Sclera anicteric. ENT: No oral lesions. Thyroid normal.  Nodes: No adenopathy. Skin: No spider angiomata. No jaundice. No palmar erythema. Respiratory: Lungs clear to auscultation. Cardiovascular: Regular heart rate. No murmurs. No JVD. Abdomen: Soft non-tender. Liver size normal to percussion/palpation. Spleen not palpable. No obvious ascites. Extremities: No edema. No muscle wasting.   No gross arthritic changes. Neurologic: Alert and oriented. Cranial nerves grossly intact. No asterixis. LABORATORY STUDIES:  [facility] [place] of [address] [place] [address] & Units 2/25/2021 4/5/2019   WBC 3.6 - 11.0 K/uL 4.2 4.8   ANC 1.8 - 8.0 K/UL 2.0 2.6   HGB 11.5 - 16.0 g/dL 13.4 15.5    - 400 K/uL 199 141 (L)   INR 0.9 - 1.1   1.0    AST 15 - 37 U/L 191 (H) 143 (H)   ALT 12 - 78 U/L 59 49   Alk Phos 45 - 117 U/L 226 (H) 275 (H)   Bili, Total 0.2 - 1.0 MG/DL 0.4 0.3   Bili, Direct 0.0 - 0.2 MG/DL 0.2    Albumin 3.5 - 5.0 g/dL 3.6 3.4 (L)   BUN 6 - 20 MG/DL 2 (L) 7   Creat 0.55 - 1.02 MG/DL 0.49 (L) 0.80   Na 136 - 145 mmol/L 137 132 (L)   K 3.5 - 5.1 mmol/L 4.0 4.2   Cl 97 - 108 mmol/L 103 94 (L)   CO2 21 - 32 mmol/L 24 27   Glucose 65 - 100 mg/dL 81 96     Cancer Screening Latest Ref Rng & Units 2/25/2021   AFP, Serum 0.0 - 8.0 ng/mL 4.8   AFP-L3% 0.0 - 9.9 % 6.8     SEROLOGIES:  Serologies Latest Ref Rng & Units 2/25/2021   Hep A Ab, Total Negative   Negative   Hep B Surface Ag Index <0.10   Hep B Surface Ag Interp Negative   Negative   Hep B Core Ab, Total Negative   Negative   Hep B Surface Ab mIU/mL <3.10   Hep B Surface Ab Interp NONREACTIVE   NONREACTIVE   Hep C Ab 0.0 - 0.9 s/co ratio <0.1   Ferritin 26 - 388 NG/ML 1,808 (H)   Iron % Saturation 20 - 50 % 93 (H)   HARINDER, IFA  Negative   ASMCA 0 - 19 Units 17   M2 Ab 0.0 - 20.0 Units 33.6 (H)   Alpha-1 antitrypsin level 101 - 187 mg/dL 220 (H)     LIVER HISTOLOGY:  Not available or performed    ENDOSCOPIC PROCEDURES:  Not available or performed    RADIOLOGY:  4/2019. CT scan abdomen with IV contrast.  Normal appearing liver. No liver mass lesions. Normal spleen. No ascites. OTHER TESTING:  Not available or performed    FOLLOW-UP:  All of the issues listed above in the Assessment and Plan were discussed with the patient. All questions were answered. The patient expressed a clear understanding of the above.     [address] [facility] 87 in 2 weeks for Fibroscan to review all data and determine the treatment plan.       MD Charli Oden[address].  [phone]  [address] [place] [address]

## 2021-02-26 LAB
A1AT SERPL-MCNC: 220 MG/DL (ref 101–187)
AFP L3 MFR SERPL: 6.8 % (ref 0–9.9)
AFP SERPL-MCNC: 4.8 NG/ML (ref 0–8)
ANA TITR SER IF: NEGATIVE {TITER}
HAV AB SER QL IA: NEGATIVE
HBV CORE AB SERPL QL IA: NEGATIVE

## 2021-02-27 LAB
ACTIN IGG SERPL-ACNC: 17 UNITS (ref 0–19)
HCV AB S/CO SERPL IA: <0.1 S/CO RATIO (ref 0–0.9)
HCV AB SERPL QL IA: NORMAL
MITOCHONDRIA M2 IGG SER-ACNC: 33.6 UNITS (ref 0–20)

## 2021-03-12 ENCOUNTER — TELEPHONE (OUTPATIENT)
Dept: HEMATOLOGY | Age: 46
End: 2021-03-12

## 2021-03-12 NOTE — TELEPHONE ENCOUNTER
----- Message from Ankit Gracia sent at 3/12/2021  8:27 AM EST -----  Regarding: Dr. Steven Alcocer: [phone]  General Message/Vendor Calls    Caller's first and last name:Pt      Reason for call:Pt would like to reschedule her 3/12/21 appointment for a 2 month follow up with Dr. Dani Cohen. Pt had to cancel her appt due to a family emergency. Callback required yes/no and why:Yes reschedule.        Best contact number(s):[phone]      Details to clarify the request:n/a      Ankit Gracia

## 2021-03-15 ENCOUNTER — OFFICE VISIT (OUTPATIENT)
Dept: HEMATOLOGY | Age: 46
End: 2021-03-15
Payer: COMMERCIAL

## 2021-03-15 VITALS
SYSTOLIC BLOOD PRESSURE: 143 MMHG | TEMPERATURE: 96.9 F | HEART RATE: 71 BPM | RESPIRATION RATE: 16 BRPM | OXYGEN SATURATION: 100 % | WEIGHT: 109.4 LBS | HEIGHT: 61 IN | DIASTOLIC BLOOD PRESSURE: 76 MMHG | BODY MASS INDEX: 20.65 KG/M2

## 2021-03-15 DIAGNOSIS — R79.89 LFTS ABNORMAL: Primary | ICD-10-CM

## 2021-03-15 PROCEDURE — 99215 OFFICE O/P EST HI 40 MIN: CPT | Performed by: NURSE PRACTITIONER

## 2021-03-15 PROCEDURE — 91200 LIVER ELASTOGRAPHY: CPT | Performed by: NURSE PRACTITIONER

## 2021-03-15 NOTE — PROGRESS NOTES
Identified pt with two pt identifiers(name and ). Reviewed record in preparation for visit and have obtained necessary documentation. Chief Complaint   Patient presents with    Elevated Liver Enzymes     f/u      Vitals:    03/15/21 0830 03/15/21 0839   BP: (!) 153/82 (!) 143/76   Pulse: 71    Resp: 16    Temp: 96.9 °F (36.1 °C)    TempSrc: Temporal    SpO2: 100%    Weight: 109 lb 6.4 oz (49.6 kg)    Height: 5' 1\" (1.549 m)    PainSc:   0 - No pain        Health Maintenance Review: Patient reminded of \"due or due soon\" health maintenance. I have asked the patient to contact his/her primary care provider (PCP) for follow-up on his/her health maintenance. Coordination of Care Questionnaire:  :   1) Have you been to an emergency room, urgent care, or hospitalized since your last visit? If yes, where when, and reason for visit? no       2. Have seen or consulted any other health care provider since your last visit? If yes, where when, and reason for visit? NO      Patient is accompanied by self I have received verbal consent from Uli Maldonado to discuss any/all medical information while they are present in the room.

## 2021-03-15 NOTE — PROGRESS NOTES
[address] [facility] [address]      Marlene Rodriguez MD, Michelle Castillo, Estela Elizalde MD, MPH      Nikunj Salazar, DAVE Neal, [facility]     Priscila Novoa, [facility]   Aidan Manjit, FNP-C    Victor Hugo Jaimes, [facility]       Matthiasangelica Ruiz [facility] 136    at [address], [address] [place] [address], [facility] 22.    [phone]    FAX: [address] [place] [address], [address]    [phone]    FAX: [phone]     Patient Care Team:  Tom Wade MD as PCP - General (Internal Medicine)  Tom Wade MD as PCP - [address] [place] [address] Dr Acharya Provider    Problem List  Date Reviewed: 2/25/2021          Codes Class Noted    Elevated liver enzymes ICD-10-CM: R74.8  ICD-9-CM: 790.5  2/25/2021        Thrombocytopenia ([address] ICD-10-CM: D69.6  ICD-9-CM: 287.5  2/25/2021        Hyponatremia ICD-10-CM: E87.1  ICD-9-CM: 276.1  2/25/2021        Anxiety ICD-10-CM: F41.9  ICD-9-CM: 300.00  1/21/2015            Braulio Ortiz is being seen at [facility] [place] [facility] for management of elevated liver enzymes which may be related to alcohol, PBC or both. The active problem list, all pertinent past medical history, medications and laboratory findings related to the liver disorder were reviewed and discussed with the patient. The patient is a 39 y.o. female who was found to have elevated liver transaminases and alkaline phosphatase in 4/2019. The patient had previously consumed 15 alcoholic drinks per day. Her last alcohol was 2/28/2021. Serologic evaluation for markers of chronic liver disease was positive for AMA, ferritin and iron saturation. CT scan of the liver was performed in 4/2019. The results of the imaging demonstrated a normal appearing liver.       She developed abdominal pain 1/2021. This has resolved with cessation of alcohol. An assessment of liver fibrosis with elastography will be performed during this office visit. The patient had the following symptoms which are thought to be due to the liver disease:  fatigue, diffuse abdominal pain, nausea and diarrhea. She is having normal stools now, no abdominal pain and is sleeping better. The patient is not currently experiencing the following symptoms of liver disease: yellowing of the eyes or skin, problems concentrating, swelling of the abdomen, swelling of the lower extremities, hematemesis or hematochezia. The patient has no limitation in activities of daily living. ASSESSMENT AND PLAN:  Elevated liver enzymes  Persistent elevation in liver transaminases and alkaline phosphatase. Have performed laboratory testing to monitor liver function and degree of liver injury. This included BMP, hepatic panel, CBC with platelet count and INR. This will be repeated today. AST is elevated. ALT is normal according to [address] scale but not normal according to our standards. ALP is elevated. The albumin is normal. The total bilirubin is normal. Liver function is normal. The platelet count is normal.      Serologic testing for causes of chronic liver disease was ordered. Results were positive for AMA, an elevation in ferritin and FE saturation. The FibroScan today suggested advanced fibrosis. This could also be a reflection of inflammation. I would repeat this procedure in 6 months. The need to perform a liver biopsy to help determine the cause and severity of the liver test abnormalities was discussed. I would like to see the results of sobriety before recommending a liver biopsy. Primary biliary cholangitis  The diagnosis is based upon serology and an elevation in ALP. Her ALP remains elevated. I will recheck enzymes today.  I would recommend waiting to see biochemical response from sobriety before moving to biopsy. Elevation in ferritin  There is an elevation in ferritin with elevated iron saturation. This is most likely related to alcohol use. I discussed this with the patient and the need to rule out HFE. I have ordered the HFE blood test today. Weight loss  Her weight is up a little today. She has been eating well. Malnutrition/muscle wasting  The patient has severe protein-calorie deficiency malnutrition. Malnutrition and muscle wasting is due to alcohol abuse and poor caloric intake. She is doing well with diet changes and eating healthier while not consuming alcohol. Alcohol liver disease  The diagnosis is made from a history of consuming alcohol in excess, elevated AST>ALT. She has tried AA and does not feel it is for her. She has a great support system in place and knows the sobriety is the only choice going forward. She will be aware and watching for pitfalls in sobriety. Screening for hepatocellular carcinoma  AFP 2/2021 normal. US ordered. Treatment of other medical problems in patients with chronic liver disease  There are no contraindications for the patient to take most medications necessary for treatment of other medical issues. The patient consumes alcohol on a daily basis or has recently stopped consuming alcohol. Regular alcohol use increases the risk of toxicity from acetaminophen. This analgesic should be avoided until the patient has been abstinent from alcohol for 6 months. Vaccinations   Vaccination for viral hepatitis A and B is recommended since the patient has no serologic evidence of previous exposure or vaccination with immunity. Routine vaccinations against other bacterial and viral agents can be performed as indicated. Annual flu vaccination should be administered if indicated.     ALLERGIES  No Known Allergies    MEDICATIONS  Current Outpatient Medications   Medication Sig    ondansetron (ZOFRAN ODT) 8 mg disintegrating tablet TAKE 1 TABLET BY MOUTH 3 TIMES A DAY AS NEEDED FOR NAUSEA    PARoxetine (PAXIL) 10 mg tablet TAKE 1 TABLET BY MOUTH EVERY DAY     No current facility-administered medications for this visit. SYSTEM REVIEW NOT RELATED TO LIVER DISEASE OR REVIEWED ABOVE:  Constitution systems: Positive for weight loss. Negative for fever, chills, weight gain. Eyes: Negative for visual changes. ENT: Negative for sore throat, painful swallowing. Respiratory: Negative for cough, hemoptysis, SOB. Cardiology: Negative for chest pain, palpitations. GI:  Negative for constipation or diarrhea. : Negative for urinary frequency, dysuria, hematuria, nocturia. Skin: Negative for rash. Hematology: Negative for easy bruising, blood clots. Musculo-skeletal: Negative for back pain, muscle pain, weakness. Neurologic: Negative for headaches, dizziness, vertigo, memory problems not related to HE. Psychology: Negative for anxiety, depression. FAMILY HISTORY:  The father has/had the following chronic disease(s): prostate cancer  The mother has/had the following chronic disease(s): none. There is no family history of liver disease. SOCIAL HISTORY:  The patient is . The patient has 1 child. The patient had smoked 2 pack of tobacco daily. She stopped on 2/28/2021. The patient had consumed 15 alcoholic beverages per day. She stopped on 2/28/2021. The patient has not worked since 2019. PHYSICAL EXAMINATION:  Visit Vitals  BP (!) 143/76   Pulse 71   Temp 96.9 °F (36.1 °C) (Temporal)   Resp 16   Ht 5' 1\" (1.549 m)   Wt 109 lb 6.4 oz (49.6 kg)   SpO2 100%   BMI 20.67 kg/m²     General: No acute distress. Thin. Eyes: Sclera anicteric. ENT: No oral lesions. Nodes: No adenopathy. Skin: No spider angiomata. No jaundice. No palmar erythema. Respiratory: Lungs clear to auscultation. Cardiovascular: Regular heart rate. No murmurs. No JVD. Abdomen: Soft non-tender, liver size normal to percussion/palpation.  Spleen not palpable. No obvious ascites. Extremities: No edema. No muscle wasting. No gross arthritic changes. Neurologic: Alert and oriented. Cranial nerves grossly intact. No asterixis. LABORATORY STUDIES:  [facility] [place] [facility] [address] [place] [address] & Units 2/25/2021 4/5/2019   WBC 3.6 - 11.0 K/uL 4.2 4.8   ANC 1.8 - 8.0 K/UL 2.0 2.6   HGB 11.5 - 16.0 g/dL 13.4 15.5    - 400 K/uL 199 141 (L)   INR 0.9 - 1.1   1.0    AST 15 - 37 U/L 191 (H) 143 (H)   ALT 12 - 78 U/L 59 49   Alk Phos 45 - 117 U/L 226 (H) 275 (H)   Bili, Total 0.2 - 1.0 MG/DL 0.4 0.3   Bili, Direct 0.0 - 0.2 MG/DL 0.2    Albumin 3.5 - 5.0 g/dL 3.6 3.4 (L)   BUN 6 - 20 MG/DL 2 (L) 7   Creat 0.55 - 1.02 MG/DL 0.49 (L) 0.80   Na 136 - 145 mmol/L 137 132 (L)   K 3.5 - 5.1 mmol/L 4.0 4.2   Cl 97 - 108 mmol/L 103 94 (L)   CO2 21 - 32 mmol/L 24 27   Glucose 65 - 100 mg/dL 81 96     Cancer Screening Latest Ref Rng & Units 2/25/2021   AFP, Serum 0.0 - 8.0 ng/mL 4.8   AFP-L3% 0.0 - 9.9 % 6.8     SEROLOGIES:  Serologies Latest Ref Rng & Units 2/25/2021   Hep A Ab, Total Negative   Negative   Hep B Surface Ag Index <0.10   Hep B Surface Ag Interp Negative   Negative   Hep B Core Ab, Total Negative   Negative   Hep B Surface Ab mIU/mL <3.10   Hep B Surface Ab Interp NONREACTIVE   NONREACTIVE   Hep C Ab 0.0 - 0.9 s/co ratio <0.1   Ferritin 26 - 388 NG/ML 1,808 (H)   Iron % Saturation 20 - 50 % 93 (H)   HARINDER, IFA  Negative   ASMCA 0 - 19 Units 17   M2 Ab 0.0 - 20.0 Units 33.6 (H)   Alpha-1 antitrypsin level 101 - 187 mg/dL 220 (H)     LIVER HISTOLOGY:  3/2021. FibroScan performed at [address]. EkPa was 13.5. IQR/med 16%. . The results suggested a fibrosis level of F3. The CAP score suggests no evidence of fatty liver. ENDOSCOPIC PROCEDURES:  Not available or performed    RADIOLOGY:  4/2019. CT scan abdomen with IV contrast. Normal appearing liver. No liver mass lesions. Normal spleen. No ascites.     OTHER TESTING:  Not available or performed    FOLLOW-UP:  All of the issues listed above in the assessment and plan were discussed with the patient. All questions were answered. The patient expressed a clear understanding of the above. [address] [address] in 4 weeks to reassess liver enzymes and review results. This visit totaled 45 minutes with time used to review recent lab results, direct patient interaction and assessment, review medications, answer patient questions and documentation of the findings in my note.     NICOLE Calderón-BC  [facility] [place] [address], [address] [address]  22.  [phone]

## 2021-03-16 LAB
ALBUMIN SERPL-MCNC: 3.5 G/DL (ref 3.5–5)
ALBUMIN/GLOB SERPL: 0.9 {RATIO} (ref 1.1–2.2)
ALP SERPL-CCNC: 129 U/L (ref 45–117)
ALT SERPL-CCNC: 19 U/L (ref 12–78)
ANION GAP SERPL CALC-SCNC: 7 MMOL/L (ref 5–15)
AST SERPL-CCNC: 36 U/L (ref 15–37)
BILIRUB DIRECT SERPL-MCNC: 0.2 MG/DL (ref 0–0.2)
BILIRUB SERPL-MCNC: 0.2 MG/DL (ref 0.2–1)
BUN SERPL-MCNC: 8 MG/DL (ref 6–20)
BUN/CREAT SERPL: 13 (ref 12–20)
CALCIUM SERPL-MCNC: 9.7 MG/DL (ref 8.5–10.1)
CHLORIDE SERPL-SCNC: 104 MMOL/L (ref 97–108)
CO2 SERPL-SCNC: 27 MMOL/L (ref 21–32)
CREAT SERPL-MCNC: 0.61 MG/DL (ref 0.55–1.02)
ERYTHROCYTE [DISTWIDTH] IN BLOOD BY AUTOMATED COUNT: 12.5 % (ref 11.5–14.5)
FERRITIN SERPL-MCNC: 754 NG/ML (ref 26–388)
GLOBULIN SER CALC-MCNC: 3.7 G/DL (ref 2–4)
GLUCOSE SERPL-MCNC: 89 MG/DL (ref 65–100)
HCT VFR BLD AUTO: 36.6 % (ref 35–47)
HGB BLD-MCNC: 11.7 G/DL (ref 11.5–16)
INR PPP: 1 (ref 0.9–1.1)
IRON SATN MFR SERPL: 17 % (ref 20–50)
IRON SERPL-MCNC: 49 UG/DL (ref 35–150)
MCH RBC QN AUTO: 33.2 PG (ref 26–34)
MCHC RBC AUTO-ENTMCNC: 32 G/DL (ref 30–36.5)
MCV RBC AUTO: 104 FL (ref 80–99)
NRBC # BLD: 0 K/UL (ref 0–0.01)
NRBC BLD-RTO: 0 PER 100 WBC
PLATELET # BLD AUTO: 220 K/UL (ref 150–400)
PMV BLD AUTO: 10.1 FL (ref 8.9–12.9)
POTASSIUM SERPL-SCNC: 4.2 MMOL/L (ref 3.5–5.1)
PROT SERPL-MCNC: 7.2 G/DL (ref 6.4–8.2)
PROTHROMBIN TIME: 10.2 SEC (ref 9–11.1)
RBC # BLD AUTO: 3.52 M/UL (ref 3.8–5.2)
SODIUM SERPL-SCNC: 138 MMOL/L (ref 136–145)
TIBC SERPL-MCNC: 282 UG/DL (ref 250–450)
WBC # BLD AUTO: 5.5 K/UL (ref 3.6–11)

## 2021-03-17 LAB
AFP L3 MFR SERPL: 6.2 % (ref 0–9.9)
AFP SERPL-MCNC: 7.3 NG/ML (ref 0–8)

## 2021-03-19 LAB
HFE GENE MUT ANL BLD/T: NORMAL
PLEASE NOTE:, 511369: NORMAL

## 2021-03-24 ENCOUNTER — HOSPITAL ENCOUNTER (OUTPATIENT)
Dept: ULTRASOUND IMAGING | Age: 46
Discharge: HOME | End: 2021-03-24
Attending: NURSE PRACTITIONER
Payer: COMMERCIAL

## 2021-03-24 DIAGNOSIS — R79.89 LFTS ABNORMAL: ICD-10-CM

## 2021-03-24 PROCEDURE — 76700 US EXAM ABDOM COMPLETE: CPT

## 2022-02-16 ENCOUNTER — TELEPHONE (OUTPATIENT)
Dept: PRIMARY CARE CLINIC | Age: 47
End: 2022-02-16

## 2022-02-16 NOTE — TELEPHONE ENCOUNTER
Patient said she tested positive for Covid and symptoms are getting worse. Patient tried to schedule a VV with Dr. Jaziel Bishop but Dr. Jaziel Bishop doesn't have any appointments available until Monday. Patient asked if Dr. Jaziel Bishop or her nurse can call her back.

## 2022-02-16 NOTE — TELEPHONE ENCOUNTER
Tested positive on Saturday.  Body aches have gone away, but patient reports having \"blinding\" headaches, coughing that is productive at times, congestion present    Using Excedrin which she reports to not be helping, no decongestant or other OTC medication reported

## 2022-02-17 ENCOUNTER — TELEPHONE (OUTPATIENT)
Dept: PRIMARY CARE CLINIC | Age: 47
End: 2022-02-17

## 2022-03-19 PROBLEM — D69.6 THROMBOCYTOPENIA (HCC): Status: ACTIVE | Noted: 2021-02-25

## 2022-03-19 PROBLEM — E87.1 HYPONATREMIA: Status: ACTIVE | Noted: 2021-02-25

## 2022-03-19 PROBLEM — R74.8 ELEVATED LIVER ENZYMES: Status: ACTIVE | Noted: 2021-02-25

## 2023-05-17 RX ORDER — PAROXETINE 10 MG/1
1 TABLET, FILM COATED ORAL DAILY
COMMUNITY
Start: 2021-04-05

## 2023-05-17 RX ORDER — ONDANSETRON 8 MG/1
TABLET, ORALLY DISINTEGRATING ORAL
COMMUNITY
Start: 2021-01-06